# Patient Record
Sex: MALE | Race: BLACK OR AFRICAN AMERICAN | Employment: FULL TIME | URBAN - METROPOLITAN AREA
[De-identification: names, ages, dates, MRNs, and addresses within clinical notes are randomized per-mention and may not be internally consistent; named-entity substitution may affect disease eponyms.]

---

## 2017-06-26 ENCOUNTER — OFFICE VISIT (OUTPATIENT)
Dept: FAMILY MEDICINE CLINIC | Facility: CLINIC | Age: 47
End: 2017-06-26

## 2017-06-26 VITALS
WEIGHT: 234.31 LBS | TEMPERATURE: 98 F | DIASTOLIC BLOOD PRESSURE: 84 MMHG | OXYGEN SATURATION: 98 % | SYSTOLIC BLOOD PRESSURE: 128 MMHG | RESPIRATION RATE: 14 BRPM | HEART RATE: 82 BPM | BODY MASS INDEX: 34.7 KG/M2 | HEIGHT: 69 IN

## 2017-06-26 DIAGNOSIS — Z79.4 TYPE 2 DIABETES MELLITUS WITHOUT COMPLICATION, WITH LONG-TERM CURRENT USE OF INSULIN (HCC): Primary | ICD-10-CM

## 2017-06-26 DIAGNOSIS — E78.2 MIXED HYPERLIPIDEMIA: ICD-10-CM

## 2017-06-26 DIAGNOSIS — I10 ESSENTIAL HYPERTENSION: ICD-10-CM

## 2017-06-26 DIAGNOSIS — E11.9 TYPE 2 DIABETES MELLITUS WITHOUT COMPLICATION, WITH LONG-TERM CURRENT USE OF INSULIN (HCC): Primary | ICD-10-CM

## 2017-06-26 PROCEDURE — 99203 OFFICE O/P NEW LOW 30 MIN: CPT | Performed by: FAMILY MEDICINE

## 2017-06-26 RX ORDER — ATORVASTATIN CALCIUM 80 MG/1
80 TABLET, FILM COATED ORAL DAILY
Refills: 0 | COMMUNITY
Start: 2017-06-22 | End: 2017-07-17

## 2017-06-26 RX ORDER — OMEGA-3-ACID ETHYL ESTERS 1 G/1
1 CAPSULE, LIQUID FILLED ORAL DAILY
COMMUNITY

## 2017-06-26 RX ORDER — FLUTICASONE PROPIONATE 50 MCG
2 SPRAY, SUSPENSION (ML) NASAL DAILY
Refills: 8 | COMMUNITY
Start: 2017-06-08 | End: 2017-07-17

## 2017-06-26 RX ORDER — PEN NEEDLE, DIABETIC 31 GX5/16"
NEEDLE, DISPOSABLE MISCELLANEOUS DAILY
Refills: 3 | COMMUNITY
Start: 2017-04-07 | End: 2018-01-09

## 2017-06-26 RX ORDER — INSULIN GLARGINE 100 [IU]/ML
10 INJECTION, SOLUTION SUBCUTANEOUS NIGHTLY
Refills: 0 | COMMUNITY
Start: 2017-06-10 | End: 2019-03-23

## 2017-06-26 RX ORDER — LISINOPRIL AND HYDROCHLOROTHIAZIDE 25; 20 MG/1; MG/1
1 TABLET ORAL DAILY
Refills: 1 | COMMUNITY
Start: 2017-06-22 | End: 2017-07-17

## 2017-06-26 NOTE — PROGRESS NOTES
CC: meds check    HPI:     Pt has diabetes. Diagnosed 3 years ago. He is on Lantus and metformin. Has been under control. Was exercising more previously. 11/7/16 his a1c was 6.6 at [facility]. HTN: chronic, stable.      ROS: :  GENERAL HEALTH: feels well oth

## 2017-07-10 ENCOUNTER — NURSE ONLY (OUTPATIENT)
Dept: FAMILY MEDICINE CLINIC | Facility: CLINIC | Age: 47
End: 2017-07-10

## 2017-07-10 DIAGNOSIS — Z00.00 GENERAL MEDICAL EXAM: Primary | ICD-10-CM

## 2017-07-10 LAB
25-HYDROXYVITAMIN D (TOTAL): 35.1 NG/ML (ref 30–100)
ALBUMIN SERPL-MCNC: 3.4 G/DL (ref 3.5–4.8)
ALP LIVER SERPL-CCNC: 100 U/L (ref 45–117)
ALT SERPL-CCNC: 38 U/L (ref 17–63)
AST SERPL-CCNC: 22 U/L (ref 15–41)
BASOPHILS # BLD AUTO: 0.05 X10(3) UL (ref 0–0.1)
BASOPHILS NFR BLD AUTO: 0.8 %
BILIRUB SERPL-MCNC: 0.5 MG/DL (ref 0.1–2)
BUN BLD-MCNC: 22 MG/DL (ref 8–20)
CALCIUM BLD-MCNC: 8.6 MG/DL (ref 8.3–10.3)
CHLORIDE: 106 MMOL/L (ref 101–111)
CHOLEST SMN-MCNC: 113 MG/DL (ref ?–200)
CO2: 28 MMOL/L (ref 22–32)
CREAT BLD-MCNC: 1 MG/DL (ref 0.7–1.3)
CREAT UR-SCNC: 210 MG/DL
EOSINOPHIL # BLD AUTO: 0.12 X10(3) UL (ref 0–0.3)
EOSINOPHIL NFR BLD AUTO: 1.8 %
ERYTHROCYTE [DISTWIDTH] IN BLOOD BY AUTOMATED COUNT: 12.8 % (ref 11.5–16)
EST. AVERAGE GLUCOSE BLD GHB EST-MCNC: 157 MG/DL (ref 68–126)
GLUCOSE BLD-MCNC: 150 MG/DL (ref 70–99)
HBA1C MFR BLD HPLC: 7.1 % (ref ?–5.7)
HCT VFR BLD AUTO: 45.9 % (ref 37–53)
HDLC SERPL-MCNC: 42 MG/DL (ref 45–?)
HDLC SERPL: 2.69 {RATIO} (ref ?–4.97)
HGB BLD-MCNC: 15.4 G/DL (ref 13–17)
IMMATURE GRANULOCYTE COUNT: 0.02 X10(3) UL (ref 0–1)
IMMATURE GRANULOCYTE RATIO %: 0.3 %
LDLC SERPL CALC-MCNC: 63 MG/DL (ref ?–130)
LYMPHOCYTES # BLD AUTO: 1.99 X10(3) UL (ref 0.9–4)
LYMPHOCYTES NFR BLD AUTO: 30.5 %
M PROTEIN MFR SERPL ELPH: 7.3 G/DL (ref 6.1–8.3)
MCH RBC QN AUTO: 29.7 PG (ref 27–33.2)
MCHC RBC AUTO-ENTMCNC: 33.6 G/DL (ref 31–37)
MCV RBC AUTO: 88.4 FL (ref 80–99)
MICROALBUMIN UR-MCNC: 1.38 MG/DL
MICROALBUMIN/CREAT 24H UR-RTO: 6.6 UG/MG (ref ?–30)
MONOCYTES # BLD AUTO: 0.59 X10(3) UL (ref 0.1–0.6)
MONOCYTES NFR BLD AUTO: 9 %
NEUTROPHIL ABS PRELIM: 3.75 X10 (3) UL (ref 1.3–6.7)
NEUTROPHILS # BLD AUTO: 3.75 X10(3) UL (ref 1.3–6.7)
NEUTROPHILS NFR BLD AUTO: 57.6 %
NONHDLC SERPL-MCNC: 71 MG/DL (ref ?–130)
PLATELET # BLD AUTO: 208 10(3)UL (ref 150–450)
POTASSIUM SERPL-SCNC: 3.6 MMOL/L (ref 3.6–5.1)
RBC # BLD AUTO: 5.19 X10(6)UL (ref 4.3–5.7)
RED CELL DISTRIBUTION WIDTH-SD: 42 FL (ref 35.1–46.3)
SODIUM SERPL-SCNC: 139 MMOL/L (ref 136–144)
TRIGLYCERIDES: 41 MG/DL (ref ?–150)
TSI SER-ACNC: 1.09 MIU/ML (ref 0.35–5.5)
VLDL: 8 MG/DL (ref 5–40)
WBC # BLD AUTO: 6.5 X10(3) UL (ref 4–13)

## 2017-07-10 PROCEDURE — 80061 LIPID PANEL: CPT | Performed by: FAMILY MEDICINE

## 2017-07-10 PROCEDURE — 83036 HEMOGLOBIN GLYCOSYLATED A1C: CPT | Performed by: FAMILY MEDICINE

## 2017-07-10 PROCEDURE — 82306 VITAMIN D 25 HYDROXY: CPT | Performed by: FAMILY MEDICINE

## 2017-07-10 PROCEDURE — 80050 GENERAL HEALTH PANEL: CPT | Performed by: FAMILY MEDICINE

## 2017-07-10 PROCEDURE — 36415 COLL VENOUS BLD VENIPUNCTURE: CPT | Performed by: FAMILY MEDICINE

## 2017-07-10 PROCEDURE — 82570 ASSAY OF URINE CREATININE: CPT | Performed by: FAMILY MEDICINE

## 2017-07-10 PROCEDURE — 82043 UR ALBUMIN QUANTITATIVE: CPT | Performed by: FAMILY MEDICINE

## 2017-07-17 ENCOUNTER — OFFICE VISIT (OUTPATIENT)
Dept: FAMILY MEDICINE CLINIC | Facility: CLINIC | Age: 47
End: 2017-07-17

## 2017-07-17 VITALS
TEMPERATURE: 99 F | RESPIRATION RATE: 14 BRPM | HEART RATE: 85 BPM | BODY MASS INDEX: 35.31 KG/M2 | WEIGHT: 238.38 LBS | SYSTOLIC BLOOD PRESSURE: 128 MMHG | OXYGEN SATURATION: 96 % | HEIGHT: 69 IN | DIASTOLIC BLOOD PRESSURE: 84 MMHG

## 2017-07-17 DIAGNOSIS — J01.00 ACUTE NON-RECURRENT MAXILLARY SINUSITIS: ICD-10-CM

## 2017-07-17 DIAGNOSIS — Z79.4 TYPE 2 DIABETES MELLITUS WITHOUT COMPLICATION, WITH LONG-TERM CURRENT USE OF INSULIN (HCC): Primary | ICD-10-CM

## 2017-07-17 DIAGNOSIS — H65.91 RIGHT NON-SUPPURATIVE OTITIS MEDIA: ICD-10-CM

## 2017-07-17 DIAGNOSIS — E11.9 TYPE 2 DIABETES MELLITUS WITHOUT COMPLICATION, WITH LONG-TERM CURRENT USE OF INSULIN (HCC): Primary | ICD-10-CM

## 2017-07-17 PROCEDURE — 99214 OFFICE O/P EST MOD 30 MIN: CPT | Performed by: FAMILY MEDICINE

## 2017-07-17 RX ORDER — LISINOPRIL AND HYDROCHLOROTHIAZIDE 25; 20 MG/1; MG/1
1 TABLET ORAL DAILY
Qty: 90 TABLET | Refills: 3 | Status: SHIPPED | OUTPATIENT
Start: 2017-07-17 | End: 2018-07-20

## 2017-07-17 RX ORDER — AMOXICILLIN AND CLAVULANATE POTASSIUM 875; 125 MG/1; MG/1
1 TABLET, FILM COATED ORAL 2 TIMES DAILY
Qty: 20 TABLET | Refills: 0 | Status: SHIPPED | OUTPATIENT
Start: 2017-07-17 | End: 2017-07-27

## 2017-07-17 RX ORDER — ATORVASTATIN CALCIUM 80 MG/1
80 TABLET, FILM COATED ORAL DAILY
Qty: 90 TABLET | Refills: 3 | Status: SHIPPED | OUTPATIENT
Start: 2017-07-17 | End: 2018-07-20

## 2017-07-17 RX ORDER — FLUTICASONE PROPIONATE 50 MCG
2 SPRAY, SUSPENSION (ML) NASAL DAILY
Qty: 1 BOTTLE | Refills: 4 | Status: SHIPPED | OUTPATIENT
Start: 2017-07-17 | End: 2018-07-20

## 2017-07-17 RX ORDER — LANCETS 33 GAUGE
1 EACH MISCELLANEOUS 2 TIMES DAILY
Qty: 1 BOX | Refills: 3 | Status: SHIPPED | OUTPATIENT
Start: 2017-07-17 | End: 2018-07-17

## 2017-07-17 NOTE — TELEPHONE ENCOUNTER
LOV: 7/17/17 for diabetes    ONETOUCH ULTRA BLUE In Vitro Strip [place] Med Name: ONE TOUCH ULTRA BLUE TEST ST(NEW)50]  TEST ONCE DAILY, AS DIRECTED, Normal, Disp-100 strip, R-0    No future appointments. Please advise.

## 2017-07-17 NOTE — TELEPHONE ENCOUNTER
LOV: 7/17/17    atorvastatin 80 MG Oral Tab  0 6/22/2017    Sig :  Take 80 mg by mouth daily. Route:   Oral       Fluticasone Propionate 50 MCG/ACT Nasal Suspension  8 6/8/2017    Sig :  2 sprays by Each Nare route daily.      Route:   Each Nare

## 2017-07-17 NOTE — PROGRESS NOTES
CC: meds check; congestion    HPI:     Congestion:   Location nasal, ear  Quality pressure, drainage  Severity moderate  Duration 5 days or more    DM:   Chronic, stable.      ROS:   GENERAL HEALTH: feels well otherwise  SKIN: denies any unusual skin lesion placed in this encounter. Meds & Refills for this Visit:  Signed Prescriptions Disp Refills    Amoxicillin-Pot Clavulanate 875-125 MG Oral Tab 20 tablet 0      Sig: Take 1 tablet by mouth 2 (two) times daily.            Imaging & Consults:  None

## 2017-07-26 ENCOUNTER — TELEPHONE (OUTPATIENT)
Dept: FAMILY MEDICINE CLINIC | Facility: CLINIC | Age: 47
End: 2017-07-26

## 2017-07-27 ENCOUNTER — MED REC SCAN ONLY (OUTPATIENT)
Dept: FAMILY MEDICINE CLINIC | Facility: CLINIC | Age: 47
End: 2017-07-27

## 2017-08-21 RX ORDER — FEXOFENADINE HCL AND PSEUDOEPHEDRINE HCI 180; 240 MG/1; MG/1
1 TABLET, EXTENDED RELEASE ORAL DAILY
Qty: 90 TABLET | Refills: 3 | Status: SHIPPED | OUTPATIENT
Start: 2017-08-21 | End: 2018-04-13

## 2017-09-11 ENCOUNTER — TELEPHONE (OUTPATIENT)
Dept: FAMILY MEDICINE CLINIC | Facility: CLINIC | Age: 47
End: 2017-09-11

## 2017-09-11 ENCOUNTER — NURSE ONLY (OUTPATIENT)
Dept: FAMILY MEDICINE CLINIC | Facility: CLINIC | Age: 47
End: 2017-09-11

## 2017-09-11 VITALS — DIASTOLIC BLOOD PRESSURE: 84 MMHG | SYSTOLIC BLOOD PRESSURE: 112 MMHG

## 2017-09-11 NOTE — TELEPHONE ENCOUNTER
Patient here for b/p check. States that he has been under a lot of stress at work lately. B/P - 112/84. B/P at home was 120/98. Patient states is not having any chest pain, head ache or any other symptoms. He will continue to monitor b/p at home.

## 2017-09-18 ENCOUNTER — TELEPHONE (OUTPATIENT)
Dept: FAMILY MEDICINE CLINIC | Facility: CLINIC | Age: 47
End: 2017-09-18

## 2017-09-18 NOTE — TELEPHONE ENCOUNTER
KELLY FROM [facility] WOULD LIKE TO SPEAK TO NURSE ABOUT HIS A1C AND LDL.  THE NUMBER U CALL YOU CAN LEAVE MESSAGE

## 2017-09-19 NOTE — TELEPHONE ENCOUNTER
Patient states the nurse is requesting the information from Paulina Caldera. Patient states it is ok to release this information to Paulina Caldera as this nurse is handling his case and requests this each time patient has labs done.       Spoke with Elton Concepcion Registered Nurse stephan

## 2017-09-25 ENCOUNTER — NURSE ONLY (OUTPATIENT)
Dept: FAMILY MEDICINE CLINIC | Facility: CLINIC | Age: 47
End: 2017-09-25

## 2017-09-25 VITALS — SYSTOLIC BLOOD PRESSURE: 124 MMHG | DIASTOLIC BLOOD PRESSURE: 89 MMHG

## 2017-09-25 DIAGNOSIS — Z23 NEED FOR VACCINATION: ICD-10-CM

## 2017-09-25 PROCEDURE — 90471 IMMUNIZATION ADMIN: CPT | Performed by: FAMILY MEDICINE

## 2017-09-25 PROCEDURE — 90686 IIV4 VACC NO PRSV 0.5 ML IM: CPT | Performed by: FAMILY MEDICINE

## 2017-09-28 ENCOUNTER — MED REC SCAN ONLY (OUTPATIENT)
Dept: FAMILY MEDICINE CLINIC | Facility: CLINIC | Age: 47
End: 2017-09-28

## 2017-10-04 NOTE — TELEPHONE ENCOUNTER
Last refill: 6- placed on pt's chart as historical    Last Visit: 7- for diabetes    Next Visit: No future appointments. Forward to Dr. Mariza Gates please advise on refills. Thanks.

## 2017-10-05 RX ORDER — INSULIN GLARGINE 100 [IU]/ML
INJECTION, SOLUTION SUBCUTANEOUS
Qty: 15 ML | Refills: 2 | Status: SHIPPED | OUTPATIENT
Start: 2017-10-05 | End: 2018-01-22

## 2018-01-09 RX ORDER — PEN NEEDLE, DIABETIC 31 GX5/16"
NEEDLE, DISPOSABLE MISCELLANEOUS
Qty: 100 EACH | Refills: 2 | Status: SHIPPED | OUTPATIENT
Start: 2018-01-09 | End: 2018-11-29

## 2018-01-09 NOTE — TELEPHONE ENCOUNTER
LOV: 7/17/17 diabetes  A1C: 7/10/17    BD PEN NEEDLE SHORT U/F 31G X 8 MM Does not apply Misc  3 4/7/2017    Sig :  daily.      Route:   (none)     Dr. Z Tono       Future Appointments  Date Time Provider Maximiliano Rdz   1/15/2018 8:15 AM [facility] LIVIER DEJON

## 2018-01-15 ENCOUNTER — NURSE ONLY (OUTPATIENT)
Dept: FAMILY MEDICINE CLINIC | Facility: CLINIC | Age: 48
End: 2018-01-15

## 2018-01-15 DIAGNOSIS — E11.9 TYPE 2 DIABETES MELLITUS WITHOUT COMPLICATION, WITH LONG-TERM CURRENT USE OF INSULIN (HCC): Primary | ICD-10-CM

## 2018-01-15 DIAGNOSIS — Z79.4 TYPE 2 DIABETES MELLITUS WITHOUT COMPLICATION, WITH LONG-TERM CURRENT USE OF INSULIN (HCC): Primary | ICD-10-CM

## 2018-01-15 LAB
EST. AVERAGE GLUCOSE BLD GHB EST-MCNC: 146 MG/DL (ref 68–126)
HBA1C MFR BLD HPLC: 6.7 % (ref ?–5.7)

## 2018-01-15 PROCEDURE — 83036 HEMOGLOBIN GLYCOSYLATED A1C: CPT | Performed by: FAMILY MEDICINE

## 2018-01-15 PROCEDURE — 36415 COLL VENOUS BLD VENIPUNCTURE: CPT | Performed by: FAMILY MEDICINE

## 2018-01-22 ENCOUNTER — OFFICE VISIT (OUTPATIENT)
Dept: FAMILY MEDICINE CLINIC | Facility: CLINIC | Age: 48
End: 2018-01-22

## 2018-01-22 VITALS
OXYGEN SATURATION: 99 % | WEIGHT: 226 LBS | RESPIRATION RATE: 12 BRPM | TEMPERATURE: 97 F | DIASTOLIC BLOOD PRESSURE: 74 MMHG | HEIGHT: 69 IN | HEART RATE: 97 BPM | BODY MASS INDEX: 33.47 KG/M2 | SYSTOLIC BLOOD PRESSURE: 108 MMHG

## 2018-01-22 DIAGNOSIS — I10 ESSENTIAL HYPERTENSION: ICD-10-CM

## 2018-01-22 DIAGNOSIS — Z79.4 TYPE 2 DIABETES MELLITUS WITHOUT COMPLICATION, WITH LONG-TERM CURRENT USE OF INSULIN (HCC): Primary | ICD-10-CM

## 2018-01-22 DIAGNOSIS — E11.9 TYPE 2 DIABETES MELLITUS WITHOUT COMPLICATION, WITH LONG-TERM CURRENT USE OF INSULIN (HCC): Primary | ICD-10-CM

## 2018-01-22 DIAGNOSIS — E78.2 MIXED HYPERLIPIDEMIA: ICD-10-CM

## 2018-01-22 PROCEDURE — 99214 OFFICE O/P EST MOD 30 MIN: CPT | Performed by: FAMILY MEDICINE

## 2018-01-22 RX ORDER — MELATONIN
COMMUNITY
End: 2019-03-23

## 2018-01-22 RX ORDER — ASPIRIN 81 MG/1
81 TABLET, CHEWABLE ORAL
COMMUNITY
Start: 2012-06-14

## 2018-01-22 NOTE — PROGRESS NOTES
CC: meds check    HPI:     DM: chronic, stable. Compliant. HTN: chronic, stable. Compliant. Lipids: chronic, stable. Compliant.        ROS: has lost weight, no cp or abd pain, no vomiting or diarrhea    Past Medical History:   Diagnosis Date   • Josefina

## 2018-01-24 ENCOUNTER — TELEPHONE (OUTPATIENT)
Dept: FAMILY MEDICINE CLINIC | Facility: CLINIC | Age: 48
End: 2018-01-24

## 2018-04-13 RX ORDER — FEXOFENADINE HYDROCHLORIDE AND PSEUDOEPHEDRINE HYDROCHLORIDE 180; 240 MG/1; MG/1
TABLET, FILM COATED, EXTENDED RELEASE ORAL
Qty: 90 TABLET | Refills: 0 | Status: SHIPPED | OUTPATIENT
Start: 2018-04-13 | End: 2018-11-12

## 2018-04-13 NOTE — TELEPHONE ENCOUNTER
Pt requesting RX for    ALLEGRA-D ALLERGY & CONGESTION 180-240MG oral Tab 24 hr.( never prescribe by PCP Dr. Marylee Collie)    Last labs 07/10/17. Last seen on 01/22/18 CC: Meds Check  /74. No future appointments. Please advise.

## 2018-06-27 ENCOUNTER — NURSE ONLY (OUTPATIENT)
Dept: FAMILY MEDICINE CLINIC | Facility: CLINIC | Age: 48
End: 2018-06-27

## 2018-06-27 DIAGNOSIS — Z00.00 ANNUAL PHYSICAL EXAM: Primary | ICD-10-CM

## 2018-06-27 PROCEDURE — 36415 COLL VENOUS BLD VENIPUNCTURE: CPT | Performed by: FAMILY MEDICINE

## 2018-06-27 PROCEDURE — 82306 VITAMIN D 25 HYDROXY: CPT | Performed by: FAMILY MEDICINE

## 2018-06-27 PROCEDURE — 80061 LIPID PANEL: CPT | Performed by: FAMILY MEDICINE

## 2018-06-27 PROCEDURE — 80050 GENERAL HEALTH PANEL: CPT | Performed by: FAMILY MEDICINE

## 2018-06-27 PROCEDURE — 82550 ASSAY OF CK (CPK): CPT | Performed by: FAMILY MEDICINE

## 2018-06-27 PROCEDURE — 83036 HEMOGLOBIN GLYCOSYLATED A1C: CPT | Performed by: FAMILY MEDICINE

## 2018-06-27 NOTE — PROGRESS NOTES
Pt here x fasting labs,    performed by DEMETRA Obrien MA w/ no accident reported, on RT arm. pt tolerate.

## 2018-07-02 ENCOUNTER — OFFICE VISIT (OUTPATIENT)
Dept: FAMILY MEDICINE CLINIC | Facility: CLINIC | Age: 48
End: 2018-07-02

## 2018-07-02 VITALS
BODY MASS INDEX: 32.78 KG/M2 | HEART RATE: 77 BPM | WEIGHT: 218.81 LBS | OXYGEN SATURATION: 99 % | DIASTOLIC BLOOD PRESSURE: 80 MMHG | SYSTOLIC BLOOD PRESSURE: 130 MMHG | TEMPERATURE: 98 F | HEIGHT: 68.5 IN | RESPIRATION RATE: 18 BRPM

## 2018-07-02 DIAGNOSIS — E78.2 MIXED HYPERLIPIDEMIA: ICD-10-CM

## 2018-07-02 DIAGNOSIS — E11.9 TYPE 2 DIABETES MELLITUS WITHOUT COMPLICATION, WITH LONG-TERM CURRENT USE OF INSULIN (HCC): Primary | ICD-10-CM

## 2018-07-02 DIAGNOSIS — I10 ESSENTIAL HYPERTENSION: ICD-10-CM

## 2018-07-02 DIAGNOSIS — Z79.4 TYPE 2 DIABETES MELLITUS WITHOUT COMPLICATION, WITH LONG-TERM CURRENT USE OF INSULIN (HCC): Primary | ICD-10-CM

## 2018-07-02 PROCEDURE — 99214 OFFICE O/P EST MOD 30 MIN: CPT | Performed by: FAMILY MEDICINE

## 2018-07-02 RX ORDER — AMOXICILLIN AND CLAVULANATE POTASSIUM 875; 125 MG/1; MG/1
TABLET, FILM COATED ORAL
COMMUNITY
Start: 2018-06-24 | End: 2018-07-04

## 2018-07-02 NOTE — PROGRESS NOTES
CC: meds check    HPI:     DM: chronic, stable. Compliant. HTN: chronic, stable. Compliant. Lipids: chronic, stable. Compliant.      ROS:   GENERAL HEALTH: feels well otherwise  SKIN: denies any unusual skin lesions or rashes  RESPIRATORY: denies sh

## 2018-07-20 RX ORDER — ATORVASTATIN CALCIUM 80 MG/1
80 TABLET, FILM COATED ORAL DAILY
Qty: 90 TABLET | Refills: 0 | Status: SHIPPED | OUTPATIENT
Start: 2018-07-20 | End: 2018-10-04

## 2018-07-20 RX ORDER — LISINOPRIL AND HYDROCHLOROTHIAZIDE 25; 20 MG/1; MG/1
1 TABLET ORAL DAILY
Qty: 90 TABLET | Refills: 0 | Status: SHIPPED | OUTPATIENT
Start: 2018-07-20 | End: 2018-10-04

## 2018-07-20 RX ORDER — FLUTICASONE PROPIONATE 50 MCG
2 SPRAY, SUSPENSION (ML) NASAL DAILY
Qty: 1 BOTTLE | Refills: 0 | Status: SHIPPED | OUTPATIENT
Start: 2018-07-20 | End: 2018-09-17

## 2018-07-20 NOTE — TELEPHONE ENCOUNTER
LOV:  7/2/18 for type 2 diabetes  A1C: 6/27/18    MetFORMIN HCl 500 MG Oral Tab 180 tablet 3 7/17/2017    Sig :  Take 1 tablet (500 mg total) by mouth 2 (two) times daily before meals.      Route:   Oral       Lisinopril-Hydrochlorothiazide 20-25 MG Oral Ta

## 2018-07-20 NOTE — TELEPHONE ENCOUNTER
Pt needs a refill on Metformin, Lisinopril. Flonase and Atorvastatin. Would like sent to [facility] in [facility] on [address].

## 2018-08-20 ENCOUNTER — TELEPHONE (OUTPATIENT)
Dept: FAMILY MEDICINE CLINIC | Facility: CLINIC | Age: 48
End: 2018-08-20

## 2018-08-20 NOTE — TELEPHONE ENCOUNTER
Received letter from [facility] stating that there may be a potential gap in care related to atorvastatin 80mg and lisinopril-hctz 20-25mg. Call to patient and patient advised that he doesn't have [facility] insurance anymore.  He is still getting his scripts filled

## 2018-09-14 NOTE — TELEPHONE ENCOUNTER
LOV: 7/2/18 for type 2 diabetes    Fluticasone Propionate 50 MCG/ACT Nasal Suspension 1 Bottle 0 7/20/2018    Sig :  2 sprays by Each Nare route daily. No future appointments. Please advise.

## 2018-09-17 RX ORDER — FLUTICASONE PROPIONATE 50 MCG
2 SPRAY, SUSPENSION (ML) NASAL DAILY
Qty: 1 BOTTLE | Refills: 0 | Status: SHIPPED | OUTPATIENT
Start: 2018-09-17 | End: 2018-11-12

## 2018-10-02 RX ORDER — INSULIN GLARGINE 100 [IU]/ML
INJECTION, SOLUTION SUBCUTANEOUS
Qty: 15 ML | Refills: 0 | Status: SHIPPED | OUTPATIENT
Start: 2018-10-02 | End: 2019-03-27

## 2018-10-02 NOTE — TELEPHONE ENCOUNTER
Per verbal Dr. Lupe Power ok to fill  LOV: 7/2/18 for type 2 diabetes   A1C: 6/27/18 6.8    LANTUS SOLOSTAR 100 UNIT/ML Subcutaneous Solution Pen-injector  0 6/10/2017    Sig :  Inject 10 Units as directed nightly.      Route:   Injection     Segundo De Souza

## 2018-10-04 ENCOUNTER — TELEPHONE (OUTPATIENT)
Dept: FAMILY MEDICINE CLINIC | Facility: CLINIC | Age: 48
End: 2018-10-04

## 2018-10-04 RX ORDER — ATORVASTATIN CALCIUM 80 MG/1
80 TABLET, FILM COATED ORAL DAILY
Qty: 90 TABLET | Refills: 0 | Status: SHIPPED | OUTPATIENT
Start: 2018-10-04 | End: 2018-11-24

## 2018-10-04 RX ORDER — LISINOPRIL AND HYDROCHLOROTHIAZIDE 25; 20 MG/1; MG/1
1 TABLET ORAL DAILY
Qty: 90 TABLET | Refills: 0 | Status: SHIPPED | OUTPATIENT
Start: 2018-10-04 | End: 2018-11-24

## 2018-10-04 NOTE — TELEPHONE ENCOUNTER
Per verbal Dr. Hema Dumont ok to fill  LOV: 7/2/18 for type 2 diabetes    Lisinopril-Hydrochlorothiazide 20-25 MG Oral Tab 90 tablet 0 7/20/2018    Sig :  Take 1 tablet by mouth daily.      Route:   Oral       Lisinopril-Hydrochlorothiazide 20-25 MG Oral Tab 90

## 2018-10-09 ENCOUNTER — TELEPHONE (OUTPATIENT)
Dept: FAMILY MEDICINE CLINIC | Facility: CLINIC | Age: 48
End: 2018-10-09

## 2018-10-09 NOTE — TELEPHONE ENCOUNTER
Per verbal Dr. Richy Pedraza ok to place. LOV: 7/2/18 for type 2 diabetes  A1C: 6/27 6.8    MetFORMIN HCl 500 MG Oral Tab 180 tablet 0 7/20/2018    Sig :  Take 1 tablet (500 mg total) by mouth 2 (two) times daily before meals.      Route:   Oral       No future

## 2018-11-10 ENCOUNTER — IMMUNIZATION (OUTPATIENT)
Dept: FAMILY MEDICINE CLINIC | Facility: CLINIC | Age: 48
End: 2018-11-10
Payer: COMMERCIAL

## 2018-11-10 DIAGNOSIS — Z23 NEED FOR VACCINATION: ICD-10-CM

## 2018-11-10 PROCEDURE — 90686 IIV4 VACC NO PRSV 0.5 ML IM: CPT | Performed by: FAMILY MEDICINE

## 2018-11-10 PROCEDURE — 90471 IMMUNIZATION ADMIN: CPT | Performed by: FAMILY MEDICINE

## 2018-11-12 NOTE — TELEPHONE ENCOUNTER
LOV:  7/2/18 for type 2 diabetes    ALLEGRA-D ALLERGY & CONGESTION 180-240 MG Oral Tablet 24 Hr 90 tablet 0 4/13/2018    Sig :  TAKE 1 TABLET BY MOUTH EVERY DAY     Route:   (none)       Fluticasone Propionate 50 MCG/ACT Nasal Suspension 1 Bottle 0 9/17/20

## 2018-11-13 RX ORDER — FEXOFENADINE HYDROCHLORIDE AND PSEUDOEPHEDRINE HYDROCHLORIDE 180; 240 MG/1; MG/1
TABLET, FILM COATED, EXTENDED RELEASE ORAL
Qty: 90 TABLET | Refills: 0 | Status: SHIPPED | OUTPATIENT
Start: 2018-11-13 | End: 2019-02-01

## 2018-11-13 RX ORDER — FLUTICASONE PROPIONATE 50 MCG
SPRAY, SUSPENSION (ML) NASAL
Qty: 1 BOTTLE | Refills: 0 | Status: SHIPPED | OUTPATIENT
Start: 2018-11-13 | End: 2019-01-31

## 2018-11-24 NOTE — TELEPHONE ENCOUNTER
Medication Quantity Refills Start End   Lisinopril-Hydrochlorothiazide 20-25 MG Oral Tab 90 tablet 0 10/4/2018      Medication Quantity Refills Start End   atorvastatin 80 MG Oral Tab 90 tablet 0 10/4/2018    Sig :  Take 1 tablet (80 mg total) by mouth nasra

## 2018-11-27 RX ORDER — LISINOPRIL AND HYDROCHLOROTHIAZIDE 25; 20 MG/1; MG/1
1 TABLET ORAL DAILY
Qty: 90 TABLET | Refills: 0 | Status: SHIPPED | OUTPATIENT
Start: 2018-11-27 | End: 2019-02-20

## 2018-11-27 RX ORDER — ATORVASTATIN CALCIUM 80 MG/1
TABLET, FILM COATED ORAL
Qty: 90 TABLET | Refills: 0 | Status: SHIPPED | OUTPATIENT
Start: 2018-11-27 | End: 2019-02-20

## 2018-11-27 NOTE — TELEPHONE ENCOUNTER
LOV: 7/2/18 for type 2 diabetes  6/27/18: A1C 6.8    MetFORMIN HCl 500 MG Oral Tab 180 tablet 0 10/9/2018    Sig :  Take 1 tablet (500 mg total) by mouth 2 (two) times daily before meals. Route:   Oral       No future appointments. Please advise.

## 2018-11-30 RX ORDER — PEN NEEDLE, DIABETIC 31 GX5/16"
NEEDLE, DISPOSABLE MISCELLANEOUS
Qty: 100 EACH | Refills: 1 | Status: SHIPPED | OUTPATIENT
Start: 2018-11-30 | End: 2019-06-16

## 2018-11-30 NOTE — TELEPHONE ENCOUNTER
LOV: 7/2/18 for type 2 diabetes  A1C: 6.8 6/27/18    BD PEN NEEDLE SHORT U/F 31G X 8 MM Does not apply Misc 100 each 2 1/9/2018    Sig :  USE WITH INSULIN DAILY     Route:   (none)       No future appointments. Please advise.

## 2019-01-03 ENCOUNTER — TELEPHONE (OUTPATIENT)
Dept: FAMILY MEDICINE CLINIC | Facility: CLINIC | Age: 49
End: 2019-01-03

## 2019-01-12 ENCOUNTER — NURSE ONLY (OUTPATIENT)
Dept: FAMILY MEDICINE CLINIC | Facility: CLINIC | Age: 49
End: 2019-01-12
Payer: COMMERCIAL

## 2019-01-12 DIAGNOSIS — E11.9 TYPE 2 DIABETES MELLITUS WITHOUT COMPLICATION, WITH LONG-TERM CURRENT USE OF INSULIN (HCC): Primary | ICD-10-CM

## 2019-01-12 DIAGNOSIS — Z79.4 TYPE 2 DIABETES MELLITUS WITHOUT COMPLICATION, WITH LONG-TERM CURRENT USE OF INSULIN (HCC): Primary | ICD-10-CM

## 2019-01-12 LAB
ALBUMIN SERPL-MCNC: 3.5 G/DL (ref 3.1–4.5)
ALBUMIN/GLOB SERPL: 0.9 {RATIO} (ref 1–2)
ALP LIVER SERPL-CCNC: 86 U/L (ref 45–117)
ALT SERPL-CCNC: 32 U/L (ref 17–63)
ANION GAP SERPL CALC-SCNC: 5 MMOL/L (ref 0–18)
AST SERPL-CCNC: 19 U/L (ref 15–41)
BILIRUB SERPL-MCNC: 0.9 MG/DL (ref 0.1–2)
BUN BLD-MCNC: 20 MG/DL (ref 8–20)
BUN/CREAT SERPL: 17.2 (ref 10–20)
CALCIUM BLD-MCNC: 9.1 MG/DL (ref 8.3–10.3)
CHLORIDE SERPL-SCNC: 107 MMOL/L (ref 101–111)
CK SERPL-CCNC: 144 IU/L (ref 39–308)
CO2 SERPL-SCNC: 30 MMOL/L (ref 22–32)
CREAT BLD-MCNC: 1.16 MG/DL (ref 0.7–1.3)
EST. AVERAGE GLUCOSE BLD GHB EST-MCNC: 137 MG/DL (ref 68–126)
GLOBULIN PLAS-MCNC: 4.1 G/DL (ref 2.8–4.4)
GLUCOSE BLD-MCNC: 117 MG/DL (ref 70–99)
HBA1C MFR BLD HPLC: 6.4 % (ref ?–5.7)
LDLC SERPL DIRECT ASSAY-MCNC: 102 MG/DL (ref ?–100)
M PROTEIN MFR SERPL ELPH: 7.6 G/DL (ref 6.4–8.2)
OSMOLALITY SERPL CALC.SUM OF ELEC: 298 MOSM/KG (ref 275–295)
POTASSIUM SERPL-SCNC: 3.8 MMOL/L (ref 3.6–5.1)
SODIUM SERPL-SCNC: 142 MMOL/L (ref 136–144)

## 2019-01-12 PROCEDURE — 83721 ASSAY OF BLOOD LIPOPROTEIN: CPT | Performed by: FAMILY MEDICINE

## 2019-01-12 PROCEDURE — 80053 COMPREHEN METABOLIC PANEL: CPT | Performed by: FAMILY MEDICINE

## 2019-01-12 PROCEDURE — 83036 HEMOGLOBIN GLYCOSYLATED A1C: CPT | Performed by: FAMILY MEDICINE

## 2019-01-12 PROCEDURE — 82550 ASSAY OF CK (CPK): CPT | Performed by: FAMILY MEDICINE

## 2019-01-12 PROCEDURE — 36415 COLL VENOUS BLD VENIPUNCTURE: CPT | Performed by: FAMILY MEDICINE

## 2019-02-01 RX ORDER — FEXOFENADINE HCL AND PSEUDOEPHEDRINE HCI 180; 240 MG/1; MG/1
1 TABLET, EXTENDED RELEASE ORAL
Qty: 90 TABLET | Refills: 0 | Status: SHIPPED | OUTPATIENT
Start: 2019-02-01 | End: 2019-05-14

## 2019-02-01 RX ORDER — FLUTICASONE PROPIONATE 50 MCG
SPRAY, SUSPENSION (ML) NASAL
Qty: 1 BOTTLE | Refills: 0 | Status: SHIPPED | OUTPATIENT
Start: 2019-02-01 | End: 2019-03-23

## 2019-02-01 NOTE — TELEPHONE ENCOUNTER
LOV: 7/2/18 for type 2 diabetes    ALLEGRA-D ALLERGY & CONGESTION 180-240 MG Oral Tablet 24 Hr 90 tablet 0 11/13/2018    Sig :  TAKE 1 TABLET BY MOUTH ONCE DAILY     Route:   (none)           FLUTICASONE PROPIONATE 50 MCG/ACT Nasal Suspension 1 Bottle 0 11

## 2019-02-20 NOTE — TELEPHONE ENCOUNTER
LOV 7/2/18 for DM, HTN, and hyperlipidemia. Last set of labs done 1/12/19.     ATORVASTATIN 80 MG Oral Tab 90 tablet 0 11/27/2018    Sig :  TAKE 1 TABLET BY MOUTH  DAILY     Route:   (none)       LISINOPRIL-HYDROCHLOROTHIAZIDE 20-25 MG Oral Tab 90 tablet 0

## 2019-02-22 ENCOUNTER — TELEPHONE (OUTPATIENT)
Dept: FAMILY MEDICINE CLINIC | Facility: CLINIC | Age: 49
End: 2019-02-22

## 2019-02-22 RX ORDER — LISINOPRIL AND HYDROCHLOROTHIAZIDE 25; 20 MG/1; MG/1
1 TABLET ORAL DAILY
Qty: 90 TABLET | Refills: 0 | Status: SHIPPED | OUTPATIENT
Start: 2019-02-22 | End: 2019-05-22

## 2019-02-22 RX ORDER — ATORVASTATIN CALCIUM 80 MG/1
TABLET, FILM COATED ORAL
Qty: 90 TABLET | Refills: 0 | Status: SHIPPED | OUTPATIENT
Start: 2019-02-22 | End: 2019-05-22

## 2019-03-23 ENCOUNTER — OFFICE VISIT (OUTPATIENT)
Dept: FAMILY MEDICINE CLINIC | Facility: CLINIC | Age: 49
End: 2019-03-23
Payer: COMMERCIAL

## 2019-03-23 VITALS
TEMPERATURE: 98 F | DIASTOLIC BLOOD PRESSURE: 80 MMHG | WEIGHT: 224 LBS | BODY MASS INDEX: 33.56 KG/M2 | SYSTOLIC BLOOD PRESSURE: 122 MMHG | HEART RATE: 83 BPM | RESPIRATION RATE: 18 BRPM | OXYGEN SATURATION: 98 % | HEIGHT: 68.5 IN

## 2019-03-23 DIAGNOSIS — Z00.00 GENERAL MEDICAL EXAM: Primary | ICD-10-CM

## 2019-03-23 PROCEDURE — 99396 PREV VISIT EST AGE 40-64: CPT | Performed by: FAMILY MEDICINE

## 2019-03-23 RX ORDER — SILDENAFIL 50 MG/1
50 TABLET, FILM COATED ORAL
Qty: 24 TABLET | Refills: 0 | Status: SHIPPED | OUTPATIENT
Start: 2019-03-23 | End: 2020-07-02

## 2019-03-23 RX ORDER — FLUTICASONE PROPIONATE 50 MCG
SPRAY, SUSPENSION (ML) NASAL
COMMUNITY
Start: 2018-11-13 | End: 2020-04-28

## 2019-03-23 NOTE — PROGRESS NOTES
Mavis Ler. is a 50year old male who presents for a complete physical exam.   HPI:   Pt complains of recent multiple ear infections.       Immunization History  Administered            Date(s) Administered    FLULAVAL 6 months & older 0.5 ml Prefilled THE SKIN NIGHTLY Disp: 15 mL Rfl: 0   aspirin 81 MG Oral Chew Tab Chew 81 mg by mouth. Disp:  Rfl:    Omega-3-acid Ethyl Esters 1 g Oral Cap Take 1 g by mouth daily.  Disp:  Rfl:    BD PEN NEEDLE SHORT U/F 31G X 8 MM Does not apply Misc AS DIRECTED WITH INS adenopathy  LUNGS: clear to auscultation  CARDIO: RRR without murmur  GI: good BS's,no masses, HSM or tenderness  MUSCULOSKELETAL: back is not tender,FROM of the back  EXTREMITIES: no cyanosis, clubbing or edema  NEURO: Oriented times three,cranial nerves

## 2019-03-28 NOTE — TELEPHONE ENCOUNTER
LOV: 3/23/19 for general medical exam  A1C: 1/12/19 6.4      LANTUS SOLOSTAR 100 UNIT/ML Subcutaneous Solution Pen-injector 15 mL 0 10/2/2018    Sig :  INJECT 10 UNITS UNDER THE SKIN NIGHTLY     Route:   (none)       No future appointments. Please advise.

## 2019-03-29 RX ORDER — INSULIN GLARGINE 100 [IU]/ML
INJECTION, SOLUTION SUBCUTANEOUS
Qty: 15 ML | Refills: 0 | Status: SHIPPED | OUTPATIENT
Start: 2019-03-29 | End: 2019-08-14

## 2019-05-15 NOTE — TELEPHONE ENCOUNTER
LOV 3/23/19 for a general exam.    Fexofenadine-Pseudoephed ER (ALLEGRA-D ALLERGY & CONGESTION) 180-240 MG Oral Tablet 24 Hr 90 tablet 0 2/1/2019    Sig:   Take 1 tablet by mouth once daily. Route:   Oral       No future appointments.

## 2019-05-17 RX ORDER — FEXOFENADINE HYDROCHLORIDE AND PSEUDOEPHEDRINE HYDROCHLORIDE 180; 240 MG/1; MG/1
TABLET, FILM COATED, EXTENDED RELEASE ORAL
Qty: 90 TABLET | Refills: 0 | Status: SHIPPED
Start: 2019-05-17 | End: 2019-12-04

## 2019-05-22 NOTE — TELEPHONE ENCOUNTER
LOV 3/23/19 for a general exam.  BP at that visit: 122/80. Last labs done: 1/12/19. All filled last on 2/22/19 for 90d. No future appointments. Please advise.

## 2019-05-24 RX ORDER — LISINOPRIL AND HYDROCHLOROTHIAZIDE 25; 20 MG/1; MG/1
1 TABLET ORAL DAILY
Qty: 90 TABLET | Refills: 0 | Status: SHIPPED | OUTPATIENT
Start: 2019-05-24 | End: 2019-08-20

## 2019-05-24 RX ORDER — ATORVASTATIN CALCIUM 80 MG/1
TABLET, FILM COATED ORAL
Qty: 90 TABLET | Refills: 0 | Status: SHIPPED | OUTPATIENT
Start: 2019-05-24 | End: 2019-08-20

## 2019-06-17 NOTE — TELEPHONE ENCOUNTER
LOV 3/23/19 for a general exam.  A1C on 1/12/19: 6.4.     Medication Quantity Refills Start End   BD PEN NEEDLE SHORT U/F 31G X 8 MM Does not apply Misc 100 each 1 11/30/2018    Sig:   AS DIRECTED WITH INSULIN     Route:   (none)       No future appointment

## 2019-06-18 RX ORDER — PEN NEEDLE, DIABETIC 31 GX5/16"
NEEDLE, DISPOSABLE MISCELLANEOUS
Qty: 100 EACH | Refills: 0 | Status: SHIPPED | OUTPATIENT
Start: 2019-06-18 | End: 2019-09-28

## 2019-08-15 ENCOUNTER — PATIENT MESSAGE (OUTPATIENT)
Dept: FAMILY MEDICINE CLINIC | Facility: CLINIC | Age: 49
End: 2019-08-15

## 2019-08-15 NOTE — TELEPHONE ENCOUNTER
From: Krystina Ivy. To: Lydia Waldrop DO  Sent: 8/15/2019 11:33 AM CDT  Subject: Prescription Question    Need insulin prescription renewed. [facility] in [facility] on [address].

## 2019-08-15 NOTE — TELEPHONE ENCOUNTER
LOV: 3/23/19 for general medical  A1C: 1/12/19    LANTUS SOLOSTAR 100 UNIT/ML Subcutaneous Solution Pen-injector 15 mL 0 3/29/2019    Sig:   INJECT 10 UNITS UNDER THE SKIN NIGHTLY     Route:   (none)       No future appointments. Please advise.

## 2019-08-16 RX ORDER — INSULIN GLARGINE 100 [IU]/ML
INJECTION, SOLUTION SUBCUTANEOUS
Qty: 15 ML | Refills: 0 | Status: SHIPPED | OUTPATIENT
Start: 2019-08-16 | End: 2019-10-07

## 2019-08-20 RX ORDER — LISINOPRIL AND HYDROCHLOROTHIAZIDE 25; 20 MG/1; MG/1
1 TABLET ORAL DAILY
Qty: 90 TABLET | Refills: 0 | Status: SHIPPED | OUTPATIENT
Start: 2019-08-20 | End: 2019-11-04

## 2019-08-20 RX ORDER — ATORVASTATIN CALCIUM 80 MG/1
TABLET, FILM COATED ORAL
Qty: 90 TABLET | Refills: 0 | Status: SHIPPED | OUTPATIENT
Start: 2019-08-20 | End: 2019-11-04

## 2019-08-20 NOTE — TELEPHONE ENCOUNTER
LOV: 3/23/19 for general medical exam  A1C: 1/12/19      ATORVASTATIN 80 MG Oral Tab 90 tablet 0 5/24/2019    Sig:   TAKE 1 TABLET BY MOUTH  DAILY     Route:   (none)       METFORMIN  MG Oral Tab 180 tablet 0 5/24/2019    Sig:   TAKE 1 TABLET BY ERIKA

## 2019-09-24 ENCOUNTER — TELEPHONE (OUTPATIENT)
Dept: FAMILY MEDICINE CLINIC | Facility: CLINIC | Age: 49
End: 2019-09-24

## 2019-09-24 NOTE — TELEPHONE ENCOUNTER
Pt called according to christine pt is due for a diabetic f/u but not sure if he will need to fast when he schedules an apt.

## 2019-09-28 NOTE — TELEPHONE ENCOUNTER
LOV 3/23/19 for a general exam.  Last A1C: 1/12/19 - 6.4    BD PEN NEEDLE SHORT U/F 31G X 8 MM Does not apply Misc 100 each 0 6/18/2019    Sig:   AS DIRECTED WITH INSULIN     Route:   (none)       Future Appointments   Date Time Provider Maximiliano Rdz

## 2019-09-30 RX ORDER — PEN NEEDLE, DIABETIC 31 GX5/16"
NEEDLE, DISPOSABLE MISCELLANEOUS
Qty: 100 EACH | Refills: 1 | Status: SHIPPED | OUTPATIENT
Start: 2019-09-30 | End: 2020-03-25

## 2019-10-07 ENCOUNTER — OFFICE VISIT (OUTPATIENT)
Dept: FAMILY MEDICINE CLINIC | Facility: CLINIC | Age: 49
End: 2019-10-07
Payer: COMMERCIAL

## 2019-10-07 VITALS
SYSTOLIC BLOOD PRESSURE: 126 MMHG | BODY MASS INDEX: 32.96 KG/M2 | WEIGHT: 220 LBS | RESPIRATION RATE: 18 BRPM | HEART RATE: 83 BPM | TEMPERATURE: 99 F | OXYGEN SATURATION: 98 % | HEIGHT: 68.5 IN | DIASTOLIC BLOOD PRESSURE: 84 MMHG

## 2019-10-07 DIAGNOSIS — I10 ESSENTIAL HYPERTENSION: ICD-10-CM

## 2019-10-07 DIAGNOSIS — Z23 NEED FOR VACCINATION: ICD-10-CM

## 2019-10-07 DIAGNOSIS — H10.13 ALLERGIC CONJUNCTIVITIS OF BOTH EYES: ICD-10-CM

## 2019-10-07 DIAGNOSIS — E78.2 MIXED HYPERLIPIDEMIA: ICD-10-CM

## 2019-10-07 DIAGNOSIS — E11.9 TYPE 2 DIABETES MELLITUS WITHOUT COMPLICATION, WITH LONG-TERM CURRENT USE OF INSULIN (HCC): Primary | ICD-10-CM

## 2019-10-07 DIAGNOSIS — Z79.4 TYPE 2 DIABETES MELLITUS WITHOUT COMPLICATION, WITH LONG-TERM CURRENT USE OF INSULIN (HCC): Primary | ICD-10-CM

## 2019-10-07 PROCEDURE — 83036 HEMOGLOBIN GLYCOSYLATED A1C: CPT | Performed by: FAMILY MEDICINE

## 2019-10-07 PROCEDURE — 90686 IIV4 VACC NO PRSV 0.5 ML IM: CPT | Performed by: FAMILY MEDICINE

## 2019-10-07 PROCEDURE — 83721 ASSAY OF BLOOD LIPOPROTEIN: CPT | Performed by: FAMILY MEDICINE

## 2019-10-07 PROCEDURE — 82550 ASSAY OF CK (CPK): CPT | Performed by: FAMILY MEDICINE

## 2019-10-07 PROCEDURE — 90471 IMMUNIZATION ADMIN: CPT | Performed by: FAMILY MEDICINE

## 2019-10-07 PROCEDURE — 84450 TRANSFERASE (AST) (SGOT): CPT | Performed by: FAMILY MEDICINE

## 2019-10-07 PROCEDURE — 99214 OFFICE O/P EST MOD 30 MIN: CPT | Performed by: FAMILY MEDICINE

## 2019-10-07 PROCEDURE — 84460 ALANINE AMINO (ALT) (SGPT): CPT | Performed by: FAMILY MEDICINE

## 2019-10-07 RX ORDER — OLOPATADINE HYDROCHLORIDE 1 MG/ML
SOLUTION/ DROPS OPHTHALMIC
Qty: 3 BOTTLE | Refills: 1 | Status: SHIPPED
Start: 2019-10-07 | End: 2020-04-28

## 2019-10-07 RX ORDER — OLOPATADINE HYDROCHLORIDE 1 MG/ML
SOLUTION/ DROPS OPHTHALMIC
COMMUNITY
Start: 2019-08-22 | End: 2019-10-07

## 2019-10-07 NOTE — PROGRESS NOTES
CC: follow up multiple issues    HPI:     1. Type 2 diabetes mellitus without complication, with long-term current use of insulin (HCC)  Chronic stable compliant with medication. Trying to avoid carbs. Trying to stay active.     2. Essential hypertension ONCE DAILY, AS DIRECTED Disp: 100 strip Rfl: 1   Omega-3-acid Ethyl Esters 1 g Oral Cap Take 1 g by mouth daily.  Disp:  Rfl:        Past Medical History:   Diagnosis Date   [facility]    • Essential hypertension        Social History    Tobacco Use Bottle; Refill: 1    No orders of the defined types were placed in this encounter.       Meds & Refills for this Visit:  Requested Prescriptions     Pending Prescriptions Disp Refills   • Olopatadine HCl 0.1 % Ophthalmic Solution  0     Si-2 DROPS TO EY

## 2019-11-05 RX ORDER — LISINOPRIL AND HYDROCHLOROTHIAZIDE 25; 20 MG/1; MG/1
1 TABLET ORAL DAILY
Qty: 90 TABLET | Refills: 0 | Status: SHIPPED | OUTPATIENT
Start: 2019-11-05 | End: 2020-02-04

## 2019-11-05 RX ORDER — ATORVASTATIN CALCIUM 80 MG/1
TABLET, FILM COATED ORAL
Qty: 90 TABLET | Refills: 0 | Status: SHIPPED | OUTPATIENT
Start: 2019-11-05 | End: 2020-02-04

## 2019-11-05 NOTE — TELEPHONE ENCOUNTER
All meds Last refilled on 8/20/19 for # 90 with 0 refills  Last a1c 6.5 on 10/7/19  Last OV 10/7/19, /84  No future appointments. Thank you.

## 2019-11-25 NOTE — TELEPHONE ENCOUNTER
Last refilled on 10/7/19 for # 15mL with 0 refills  Last a1c 6.5 on 10/7/19  Last OV 10/7/19  No future appointments. Thank you.

## 2019-11-27 NOTE — TELEPHONE ENCOUNTER
Received refill request for lantus. Last refilled on 10/7/19 for # 15mL with 0 refills  Last a1c 6.5 on 10/7/19  Last OV 10/7/19  No future appointments. Thank you.

## 2019-11-28 RX ORDER — INSULIN GLARGINE 100 [IU]/ML
INJECTION, SOLUTION SUBCUTANEOUS
Qty: 15 ML | Refills: 0 | Status: SHIPPED | OUTPATIENT
Start: 2019-11-28 | End: 2020-04-28

## 2019-12-04 RX ORDER — FEXOFENADINE HYDROCHLORIDE AND PSEUDOEPHEDRINE HYDROCHLORIDE 180; 240 MG/1; MG/1
TABLET, FILM COATED, EXTENDED RELEASE ORAL
Qty: 30 TABLET | Refills: 0 | Status: SHIPPED
Start: 2019-12-04 | End: 2020-03-25

## 2019-12-04 NOTE — TELEPHONE ENCOUNTER
Pt states he takes this 5 out of 7 days of the week usually. He has tried plain Allegra and it does not work for him.

## 2019-12-04 NOTE — TELEPHONE ENCOUNTER
LOV 10/7/19 for DM, HTN, hyperlipidemia, allergic conjunctivitis. ALLEGRA-D ALLERGY & CONGESTION 180-240 MG Oral Tablet 24 Hr 90 tablet 0 5/17/2019    Sig:   TAKE 1 TABLET BY MOUTH DAILY     Route:   (none)       No future appointments.

## 2020-02-03 NOTE — TELEPHONE ENCOUNTER
LOV 10/7/19 for DM, HTN, hyperlipidemia. BP: 126/84  Last labs done: 10/7/19  A1C: 6.5    All 3 last filled on 11/5/19 for 90d. No future appointments. Please advise.

## 2020-02-04 RX ORDER — LISINOPRIL AND HYDROCHLOROTHIAZIDE 25; 20 MG/1; MG/1
1 TABLET ORAL DAILY
Qty: 90 TABLET | Refills: 0 | Status: SHIPPED | OUTPATIENT
Start: 2020-02-04 | End: 2020-04-28

## 2020-02-04 RX ORDER — ATORVASTATIN CALCIUM 80 MG/1
TABLET, FILM COATED ORAL
Qty: 90 TABLET | Refills: 0 | Status: SHIPPED | OUTPATIENT
Start: 2020-02-04 | End: 2020-04-28

## 2020-03-25 RX ORDER — PEN NEEDLE, DIABETIC 31 GX5/16"
NEEDLE, DISPOSABLE MISCELLANEOUS
Qty: 100 EACH | Refills: 1 | Status: SHIPPED | OUTPATIENT
Start: 2020-03-25 | End: 2020-04-28

## 2020-03-25 NOTE — TELEPHONE ENCOUNTER
Medication Quantity Refills Start End   ALLEGRA-D ALLERGY & CONGESTION 180-240 MG Oral Tablet 24 Hr 30 tablet 0 12/4/2019      Last OV 10/7/19 for DM  No future appointments. Please advise.  Thank you

## 2020-03-25 NOTE — TELEPHONE ENCOUNTER
Last refilled on 9/30/19 for # 100 with 1 refills  Last OV 10/7/19 for DM  Refill sent per protocol        Diabetic Supplies Protocol Passed3/25 9:45 AM   Appointment in the past 12 or next 3 months

## 2020-03-26 RX ORDER — FEXOFENADINE HYDROCHLORIDE AND PSEUDOEPHEDRINE HYDROCHLORIDE 180; 240 MG/1; MG/1
TABLET, FILM COATED, EXTENDED RELEASE ORAL
Qty: 90 TABLET | Refills: 0 | OUTPATIENT
Start: 2020-03-26 | End: 2021-08-10

## 2020-04-24 ENCOUNTER — TELEPHONE (OUTPATIENT)
Dept: FAMILY MEDICINE CLINIC | Facility: CLINIC | Age: 50
End: 2020-04-24

## 2020-04-24 NOTE — TELEPHONE ENCOUNTER
Ellen Brown. verbally consents to a Virtual/Telephone Check-In service on 4/28/20.   Patient understands and accepts financial responsibility for any deductible, co-insurance and/or co-pays associated with this service      Future Appointments   Date Ti

## 2020-04-28 ENCOUNTER — TELEPHONE (OUTPATIENT)
Dept: FAMILY MEDICINE CLINIC | Facility: CLINIC | Age: 50
End: 2020-04-28

## 2020-04-28 ENCOUNTER — TELEMEDICINE (OUTPATIENT)
Dept: FAMILY MEDICINE CLINIC | Facility: CLINIC | Age: 50
End: 2020-04-28

## 2020-04-28 VITALS — BODY MASS INDEX: 33 KG/M2 | WEIGHT: 220 LBS

## 2020-04-28 DIAGNOSIS — I10 ESSENTIAL HYPERTENSION: ICD-10-CM

## 2020-04-28 DIAGNOSIS — E11.9 TYPE 2 DIABETES MELLITUS WITHOUT COMPLICATION, WITH LONG-TERM CURRENT USE OF INSULIN (HCC): Primary | ICD-10-CM

## 2020-04-28 DIAGNOSIS — E78.2 MIXED HYPERLIPIDEMIA: ICD-10-CM

## 2020-04-28 DIAGNOSIS — H10.13 ALLERGIC CONJUNCTIVITIS OF BOTH EYES: ICD-10-CM

## 2020-04-28 DIAGNOSIS — J30.89 ALLERGIC RHINITIS DUE TO OTHER ALLERGIC TRIGGER, UNSPECIFIED SEASONALITY: ICD-10-CM

## 2020-04-28 DIAGNOSIS — Z79.4 TYPE 2 DIABETES MELLITUS WITHOUT COMPLICATION, WITH LONG-TERM CURRENT USE OF INSULIN (HCC): Primary | ICD-10-CM

## 2020-04-28 PROCEDURE — 99214 OFFICE O/P EST MOD 30 MIN: CPT | Performed by: FAMILY MEDICINE

## 2020-04-28 RX ORDER — OLOPATADINE HYDROCHLORIDE 1 MG/ML
SOLUTION/ DROPS OPHTHALMIC
Qty: 3 BOTTLE | Refills: 1 | Status: SHIPPED | OUTPATIENT
Start: 2020-04-28 | End: 2021-01-21

## 2020-04-28 RX ORDER — ATORVASTATIN CALCIUM 80 MG/1
80 TABLET, FILM COATED ORAL DAILY
Qty: 90 TABLET | Refills: 1 | Status: SHIPPED | OUTPATIENT
Start: 2020-04-28 | End: 2020-10-16

## 2020-04-28 RX ORDER — LISINOPRIL AND HYDROCHLOROTHIAZIDE 25; 20 MG/1; MG/1
1 TABLET ORAL DAILY
Qty: 90 TABLET | Refills: 1 | Status: SHIPPED | OUTPATIENT
Start: 2020-04-28 | End: 2020-10-16

## 2020-04-28 NOTE — PROGRESS NOTES
CC: meds check    HPI:   Type 2 diabetes mellitus without complication, with long-term current use of insulin (HCC)  Chronic, stable. SMBG flucutaion - range . Essential hypertension  Chronic, stable, compliant. 130s/80s.      Mixed hyperlipidemia mg total) by mouth daily as needed for Erectile Dysfunction.  (Patient not taking: Reported on 4/28/2020 ) 24 tablet 0       Past Medical History:   Diagnosis Date   • Diabetes [facility])    • Essential hypertension        Social History    Tobacco Use      Smok of the defined types were placed in this encounter.       Meds & Refills for this Visit:  Requested Prescriptions     Signed Prescriptions Disp Refills   • Insulin Pen Needle (BD PEN NEEDLE SHORT U/F) 31G X 8 MM Does not apply Misc 100 each 1     Sig: USE W

## 2020-04-28 NOTE — TELEPHONE ENCOUNTER
Spoke to patient. Patient wants to keep as 2 drops into both eyes twice daily. Signed form faxed back to [employer] at [phone]    Copy sent to scan.

## 2020-04-28 NOTE — TELEPHONE ENCOUNTER
Received fax from [address] [place] [address] to clarify \"the ATYPICAL DIRECTIONS for the prescription for OLAPATADINE OP SOL 0.1%. Typically dosed 1 drop into affected eye(s) twice daily. \"    Medication Quantity Refills Start End   Olopatadine HCl 0.1 % Ophthalmic Solutio

## 2020-06-04 ENCOUNTER — TELEPHONE (OUTPATIENT)
Dept: FAMILY MEDICINE CLINIC | Facility: CLINIC | Age: 50
End: 2020-06-04

## 2020-06-04 DIAGNOSIS — Z79.4 TYPE 2 DIABETES MELLITUS WITHOUT COMPLICATION, WITH LONG-TERM CURRENT USE OF INSULIN (HCC): Primary | ICD-10-CM

## 2020-06-04 DIAGNOSIS — E11.9 TYPE 2 DIABETES MELLITUS WITHOUT COMPLICATION, WITH LONG-TERM CURRENT USE OF INSULIN (HCC): Primary | ICD-10-CM

## 2020-06-04 DIAGNOSIS — E78.2 MIXED HYPERLIPIDEMIA: ICD-10-CM

## 2020-06-04 NOTE — TELEPHONE ENCOUNTER
----- Message from Leandro Ann RN sent at 4/28/2020  9:30 AM CDT -----  Patient needs DM and cholesterol blood work done in June

## 2020-06-11 NOTE — TELEPHONE ENCOUNTER
Spoke to patient. Scheduled RN visit. Advised to fast and call from back of office when he arrives. Patient verbalized understanding.      Future Appointments   Date Time Provider Maximiliano Rdz   6/13/2020  8:45 AM [facility] [place] NURSE EMGROLANDOW [facility] Kevin Altamirano

## 2020-06-13 ENCOUNTER — NURSE ONLY (OUTPATIENT)
Dept: FAMILY MEDICINE CLINIC | Facility: CLINIC | Age: 50
End: 2020-06-13
Payer: COMMERCIAL

## 2020-06-13 DIAGNOSIS — E78.2 MIXED HYPERLIPIDEMIA: ICD-10-CM

## 2020-06-13 DIAGNOSIS — Z79.4 TYPE 2 DIABETES MELLITUS WITHOUT COMPLICATION, WITH LONG-TERM CURRENT USE OF INSULIN (HCC): ICD-10-CM

## 2020-06-13 DIAGNOSIS — E11.9 TYPE 2 DIABETES MELLITUS WITHOUT COMPLICATION, WITH LONG-TERM CURRENT USE OF INSULIN (HCC): ICD-10-CM

## 2020-06-13 PROCEDURE — 83036 HEMOGLOBIN GLYCOSYLATED A1C: CPT | Performed by: FAMILY MEDICINE

## 2020-06-13 PROCEDURE — 80061 LIPID PANEL: CPT | Performed by: FAMILY MEDICINE

## 2020-06-13 NOTE — PROGRESS NOTES
Jacquelyn Baldwin. presents today for nurse visit. Labs ordered by Dr. Antoinette Decker. 1 green, 1 lavendar drawn from right [facility] area with straight needle. Patient tolerated well. Left office in stable condition.

## 2020-06-16 ENCOUNTER — VIRTUAL PHONE E/M (OUTPATIENT)
Dept: FAMILY MEDICINE CLINIC | Facility: CLINIC | Age: 50
End: 2020-06-16
Payer: COMMERCIAL

## 2020-06-16 ENCOUNTER — TELEPHONE (OUTPATIENT)
Dept: FAMILY MEDICINE CLINIC | Facility: CLINIC | Age: 50
End: 2020-06-16

## 2020-06-16 DIAGNOSIS — Z71.1 WORRIED WELL: Primary | ICD-10-CM

## 2020-06-16 PROCEDURE — 99212 OFFICE O/P EST SF 10 MIN: CPT | Performed by: FAMILY MEDICINE

## 2020-06-16 NOTE — TELEPHONE ENCOUNTER
Patient advised. Scheduled televisit. Verified insurance.    Future Appointments   Date Time Provider Maximiliano Rdz   6/16/2020  4:00 PM Ton Barney DO [facility] Stephan Kay. verbally consents to a Virtual/Telephone Check-In service o

## 2020-06-16 NOTE — TELEPHONE ENCOUNTER
Spoke to patient. Currently has no sx. Was very sick in February. Requesting covid ab test order. Advised him to call insurance to make sure it is covered. Wants order sent to [facility] at    [address] [facility], [place]    Please advise.

## 2020-06-16 NOTE — PROGRESS NOTES
Virtual Telephone Check-In    Amy Pires. verbally consents to a Virtual/Telephone Check-In visit on 06/16/20. Patient has been referred to the [facility] website at [email or web address] to review the yearly Consent to Treat document.     Patient unders

## 2020-06-16 NOTE — TELEPHONE ENCOUNTER
Pt left vm, states he wants to get an order for covid test, states he is having some issues,  Pt did not state what issues are.   Please call

## 2020-07-02 ENCOUNTER — PATIENT MESSAGE (OUTPATIENT)
Dept: FAMILY MEDICINE CLINIC | Facility: CLINIC | Age: 50
End: 2020-07-02

## 2020-07-02 RX ORDER — SILDENAFIL 50 MG/1
50 TABLET, FILM COATED ORAL
Qty: 8 TABLET | Refills: 0 | Status: SHIPPED | OUTPATIENT
Start: 2020-07-02 | End: 2021-05-14

## 2020-07-02 NOTE — TELEPHONE ENCOUNTER
Last refilled on 3/23/19 for # 24 with 0 refills  Last OV televisit 6/16/20  No future appointments. Thank you.

## 2020-07-02 NOTE — TELEPHONE ENCOUNTER
From: Na Mata.   To: Evonne Kathleen DO  Sent: 7/2/2020 10:07 AM CDT  Subject: Prescription Question    I believe Marleny sent a request

## 2020-10-09 ENCOUNTER — HOSPITAL ENCOUNTER (OUTPATIENT)
Dept: CT IMAGING | Age: 50
Discharge: HOME | End: 2020-10-09
Attending: FAMILY MEDICINE

## 2020-10-09 DIAGNOSIS — Z13.9 VISIT FOR SCREENING: ICD-10-CM

## 2020-10-15 DIAGNOSIS — E11.9 TYPE 2 DIABETES MELLITUS WITHOUT COMPLICATION, WITH LONG-TERM CURRENT USE OF INSULIN (HCC): ICD-10-CM

## 2020-10-15 DIAGNOSIS — Z79.4 TYPE 2 DIABETES MELLITUS WITHOUT COMPLICATION, WITH LONG-TERM CURRENT USE OF INSULIN (HCC): ICD-10-CM

## 2020-10-15 DIAGNOSIS — I10 ESSENTIAL HYPERTENSION: Primary | ICD-10-CM

## 2020-10-15 DIAGNOSIS — E78.2 MIXED HYPERLIPIDEMIA: ICD-10-CM

## 2020-10-16 RX ORDER — LISINOPRIL AND HYDROCHLOROTHIAZIDE 25; 20 MG/1; MG/1
1 TABLET ORAL DAILY
Qty: 90 TABLET | Refills: 0 | Status: SHIPPED | OUTPATIENT
Start: 2020-10-16 | End: 2021-01-13

## 2020-10-16 RX ORDER — ATORVASTATIN CALCIUM 80 MG/1
TABLET, FILM COATED ORAL
Qty: 90 TABLET | Refills: 0 | Status: SHIPPED | OUTPATIENT
Start: 2020-10-16 | End: 2021-01-05

## 2020-10-16 NOTE — TELEPHONE ENCOUNTER
Spoke with pt  Pt notify  Bringing pt sent okay per protocol  Apt scheduled.   Future Appointments   Date Time Provider Maximiliano Rdz   10/22/2020  2:00 PM [facility] [place] NURSE [facility] [place]     cmp ordered

## 2020-10-16 NOTE — TELEPHONE ENCOUNTER
LOV: 6/16/20 for worried well (telemedicine visit)  A1C: 6/13/20  Patient fails protocol due to not having a recent CMP  LM to schedule nurse visit for CMP    Hypertension Medications Protocol Ghqlry31/15/2020 09:26 PM   CMP or BMP in past 12 months Protoc

## 2020-10-22 ENCOUNTER — NURSE ONLY (OUTPATIENT)
Dept: FAMILY MEDICINE CLINIC | Facility: CLINIC | Age: 50
End: 2020-10-22
Payer: COMMERCIAL

## 2020-10-22 DIAGNOSIS — E11.9 TYPE 2 DIABETES MELLITUS WITHOUT COMPLICATION, WITH LONG-TERM CURRENT USE OF INSULIN (HCC): ICD-10-CM

## 2020-10-22 DIAGNOSIS — Z79.4 TYPE 2 DIABETES MELLITUS WITHOUT COMPLICATION, WITH LONG-TERM CURRENT USE OF INSULIN (HCC): ICD-10-CM

## 2020-10-22 DIAGNOSIS — I10 ESSENTIAL HYPERTENSION: ICD-10-CM

## 2020-10-22 DIAGNOSIS — E78.2 MIXED HYPERLIPIDEMIA: ICD-10-CM

## 2020-10-22 DIAGNOSIS — Z23 NEED FOR VACCINATION: ICD-10-CM

## 2020-10-22 PROCEDURE — 90471 IMMUNIZATION ADMIN: CPT | Performed by: FAMILY MEDICINE

## 2020-10-22 PROCEDURE — 90686 IIV4 VACC NO PRSV 0.5 ML IM: CPT | Performed by: FAMILY MEDICINE

## 2020-10-22 PROCEDURE — 80053 COMPREHEN METABOLIC PANEL: CPT | Performed by: FAMILY MEDICINE

## 2020-10-22 NOTE — PROGRESS NOTES
Discharge instructions went over with patient    Electronically signed by Emmy Cyr RN on 11/16/2018 at 12:45 PM Erica Germain. presents today for nurse visit. Labs ordered by Dr. Cathy Reyes. Green tube drawn from right ac area with a butterfly. Patient tolerated well. Left office in stable condition. Flu vaccine given in left deltoid.   [employer]

## 2020-11-02 RX ORDER — INSULIN GLARGINE 100 [IU]/ML
INJECTION, SOLUTION SUBCUTANEOUS
Qty: 15 ML | Refills: 3 | Status: SHIPPED | OUTPATIENT
Start: 2020-11-02 | End: 2021-11-29

## 2020-11-02 NOTE — TELEPHONE ENCOUNTER
Last refilled on 4/28/20 for # 15mL with 1 refills  Last labs a1c 6.1 on 6/13/20  Last OV televisit 6/16/20  No future appointments. Thank you.

## 2020-11-05 RX ORDER — FEXOFENADINE HYDROCHLORIDE AND PSEUDOEPHEDRINE HYDROCHLORIDE 180; 240 MG/1; MG/1
TABLET, FILM COATED, EXTENDED RELEASE ORAL
Qty: 30 TABLET | Refills: 0 | OUTPATIENT
Start: 2020-11-05

## 2020-11-05 NOTE — TELEPHONE ENCOUNTER
Last refilled: 3/26/20 - 90 tab - 0 refills    Last OV: 6/16/20 - telemed - worried well - [place]      No future appts    Please advise.

## 2021-01-13 ENCOUNTER — TELEPHONE (OUTPATIENT)
Dept: FAMILY MEDICINE CLINIC | Facility: CLINIC | Age: 51
End: 2021-01-13

## 2021-01-13 RX ORDER — LISINOPRIL AND HYDROCHLOROTHIAZIDE 25; 20 MG/1; MG/1
1 TABLET ORAL DAILY
Qty: 90 TABLET | Refills: 0 | Status: SHIPPED | OUTPATIENT
Start: 2021-01-13 | End: 2021-03-27

## 2021-01-13 RX ORDER — ATORVASTATIN CALCIUM 80 MG/1
80 TABLET, FILM COATED ORAL DAILY
Qty: 90 TABLET | Refills: 0 | Status: SHIPPED | OUTPATIENT
Start: 2021-01-13 | End: 2021-03-27

## 2021-01-13 NOTE — TELEPHONE ENCOUNTER
Call from patient. Was seen last week at [facility] [address] for chest tightness. Did EKG and chest xray was told to f/u with PCP for repeat chest xray. No fever, no cough, no SOB, no covid exp. Advised needs to scheduled IC f/u with Dr Yohannes Gunter for 1/18.  Ad

## 2021-01-13 NOTE — TELEPHONE ENCOUNTER
Pt went in to IC was told to see his pcp because he will need a second xray to be ordered he has a Px scheduled 01/23

## 2021-01-13 NOTE — TELEPHONE ENCOUNTER
Pt notified and verbalized understanding. OV scheduled. Future Appointments   Date Time Provider Maximiliano Rdz   1/23/2021  9:20 AM Jatin Mckeon MD [facility] [place]      Rx's filled.

## 2021-01-18 ENCOUNTER — OFFICE VISIT (OUTPATIENT)
Dept: FAMILY MEDICINE CLINIC | Facility: CLINIC | Age: 51
End: 2021-01-18
Payer: COMMERCIAL

## 2021-01-18 VITALS
HEIGHT: 70 IN | RESPIRATION RATE: 18 BRPM | TEMPERATURE: 97 F | HEART RATE: 73 BPM | SYSTOLIC BLOOD PRESSURE: 128 MMHG | OXYGEN SATURATION: 97 % | DIASTOLIC BLOOD PRESSURE: 82 MMHG | BODY MASS INDEX: 32.5 KG/M2 | WEIGHT: 227 LBS

## 2021-01-18 DIAGNOSIS — R91.8 MULTILOBAR LUNG INFILTRATE: Primary | ICD-10-CM

## 2021-01-18 PROCEDURE — 99213 OFFICE O/P EST LOW 20 MIN: CPT | Performed by: FAMILY MEDICINE

## 2021-01-18 PROCEDURE — 3008F BODY MASS INDEX DOCD: CPT | Performed by: FAMILY MEDICINE

## 2021-01-18 PROCEDURE — 3074F SYST BP LT 130 MM HG: CPT | Performed by: FAMILY MEDICINE

## 2021-01-18 PROCEDURE — 3079F DIAST BP 80-89 MM HG: CPT | Performed by: FAMILY MEDICINE

## 2021-01-18 RX ORDER — FLUTICASONE PROPIONATE 50 MCG
SPRAY, SUSPENSION (ML) NASAL DAILY
COMMUNITY
End: 2021-08-10

## 2021-01-18 NOTE — PROGRESS NOTES
Ic follow up for chest pain    HPI:    Patient ID: Ely Kumar. is a 48year old male. HPI   Chest tightness went to IC at [facility] on 2 wks ago. Patient had chest pain for the sternal area and right side.   Chest x-ray and EKG done were normal.  No Oral Cap Take 1 g by mouth daily.      • Insulin Pen Needle (BD PEN NEEDLE SHORT U/F) 31G X 8 MM Does not apply Misc USE WITH INSULIN AS DIRECTED 100 each 1   • Olopatadine HCl 0.1 % Ophthalmic Solution 2 DROPS TO both EYES TWICE DAILY 3 Bottle 1     Allerg

## 2021-01-21 DIAGNOSIS — H10.13 ALLERGIC CONJUNCTIVITIS OF BOTH EYES: ICD-10-CM

## 2021-01-21 RX ORDER — OLOPATADINE HYDROCHLORIDE 1 MG/ML
SOLUTION/ DROPS OPHTHALMIC
Qty: 3 BOTTLE | Refills: 1 | Status: SHIPPED | OUTPATIENT
Start: 2021-01-21 | End: 2021-01-23

## 2021-01-21 RX ORDER — PEN NEEDLE, DIABETIC 31 GX5/16"
NEEDLE, DISPOSABLE MISCELLANEOUS
Qty: 100 EACH | Refills: 1 | Status: SHIPPED | OUTPATIENT
Start: 2021-01-21 | End: 2021-01-23

## 2021-01-21 NOTE — TELEPHONE ENCOUNTER
Needles Last refilled on 4/28/20 for # 100 with 1 refills  Eye drops 4/28/20 3 bottle 1 refill  Last OV 1/18/21  Future Appointments   Date Time Provider Maximiliano Rdz   1/23/2021  9:20 AM Eugene Mazariegos MD [facility] [place]        Thank you.

## 2021-01-23 ENCOUNTER — OFFICE VISIT (OUTPATIENT)
Dept: FAMILY MEDICINE CLINIC | Facility: CLINIC | Age: 51
End: 2021-01-23
Payer: COMMERCIAL

## 2021-01-23 ENCOUNTER — HOSPITAL ENCOUNTER (OUTPATIENT)
Dept: GENERAL RADIOLOGY | Age: 51
Discharge: HOME | End: 2021-01-23
Attending: FAMILY MEDICINE
Payer: COMMERCIAL

## 2021-01-23 VITALS
RESPIRATION RATE: 20 BRPM | WEIGHT: 226 LBS | DIASTOLIC BLOOD PRESSURE: 78 MMHG | TEMPERATURE: 97 F | BODY MASS INDEX: 32.35 KG/M2 | SYSTOLIC BLOOD PRESSURE: 118 MMHG | OXYGEN SATURATION: 98 % | HEIGHT: 70 IN | HEART RATE: 65 BPM

## 2021-01-23 DIAGNOSIS — E78.2 MIXED HYPERLIPIDEMIA: ICD-10-CM

## 2021-01-23 DIAGNOSIS — I10 ESSENTIAL HYPERTENSION: ICD-10-CM

## 2021-01-23 DIAGNOSIS — Z00.00 ANNUAL PHYSICAL EXAM: Primary | ICD-10-CM

## 2021-01-23 DIAGNOSIS — E11.9 TYPE 2 DIABETES MELLITUS WITHOUT COMPLICATION, WITH LONG-TERM CURRENT USE OF INSULIN (HCC): ICD-10-CM

## 2021-01-23 DIAGNOSIS — Z12.11 ENCOUNTER FOR SCREENING COLONOSCOPY: ICD-10-CM

## 2021-01-23 DIAGNOSIS — H10.13 ALLERGIC CONJUNCTIVITIS OF BOTH EYES: ICD-10-CM

## 2021-01-23 DIAGNOSIS — R91.8 MULTILOBAR LUNG INFILTRATE: ICD-10-CM

## 2021-01-23 DIAGNOSIS — Z79.4 TYPE 2 DIABETES MELLITUS WITHOUT COMPLICATION, WITH LONG-TERM CURRENT USE OF INSULIN (HCC): ICD-10-CM

## 2021-01-23 LAB
ALBUMIN SERPL-MCNC: 3.8 G/DL (ref 3.4–5)
ALBUMIN/GLOB SERPL: 0.9 {RATIO} (ref 1–2)
ALP LIVER SERPL-CCNC: 90 U/L
ALT SERPL-CCNC: 30 U/L
ANION GAP SERPL CALC-SCNC: 4 MMOL/L (ref 0–18)
AST SERPL-CCNC: 21 U/L (ref 15–37)
BASOPHILS # BLD AUTO: 0.06 X10(3) UL (ref 0–0.2)
BASOPHILS NFR BLD AUTO: 0.9 %
BILIRUB SERPL-MCNC: 1.1 MG/DL (ref 0.1–2)
BUN BLD-MCNC: 20 MG/DL (ref 7–18)
BUN/CREAT SERPL: 17.9 (ref 10–20)
CALCIUM BLD-MCNC: 9.4 MG/DL (ref 8.5–10.1)
CHLORIDE SERPL-SCNC: 108 MMOL/L (ref 98–112)
CHOLEST SMN-MCNC: 168 MG/DL (ref ?–200)
CO2 SERPL-SCNC: 29 MMOL/L (ref 21–32)
COMPLEXED PSA SERPL-MCNC: 0.38 NG/ML (ref ?–4)
CREAT BLD-MCNC: 1.12 MG/DL
CREAT UR-SCNC: 263 MG/DL
DEPRECATED RDW RBC AUTO: 41.8 FL (ref 35.1–46.3)
EOSINOPHIL # BLD AUTO: 0.17 X10(3) UL (ref 0–0.7)
EOSINOPHIL NFR BLD AUTO: 2.5 %
ERYTHROCYTE [DISTWIDTH] IN BLOOD BY AUTOMATED COUNT: 12.9 % (ref 11–15)
EST. AVERAGE GLUCOSE BLD GHB EST-MCNC: 131 MG/DL (ref 68–126)
GLOBULIN PLAS-MCNC: 4.2 G/DL (ref 2.8–4.4)
GLUCOSE BLD-MCNC: 90 MG/DL (ref 70–99)
HBA1C MFR BLD HPLC: 6.2 % (ref ?–5.7)
HCT VFR BLD AUTO: 49 %
HDLC SERPL-MCNC: 42 MG/DL (ref 40–59)
HGB BLD-MCNC: 16.4 G/DL
IMM GRANULOCYTES # BLD AUTO: 0.02 X10(3) UL (ref 0–1)
IMM GRANULOCYTES NFR BLD: 0.3 %
LDLC SERPL CALC-MCNC: 117 MG/DL (ref ?–100)
LYMPHOCYTES # BLD AUTO: 1.7 X10(3) UL (ref 1–4)
LYMPHOCYTES NFR BLD AUTO: 25.1 %
M PROTEIN MFR SERPL ELPH: 8 G/DL (ref 6.4–8.2)
MCH RBC QN AUTO: 30.1 PG (ref 26–34)
MCHC RBC AUTO-ENTMCNC: 33.5 G/DL (ref 31–37)
MCV RBC AUTO: 89.9 FL
MICROALBUMIN UR-MCNC: 1.32 MG/DL
MICROALBUMIN/CREAT 24H UR-RTO: 5 UG/MG (ref ?–30)
MONOCYTES # BLD AUTO: 0.52 X10(3) UL (ref 0.1–1)
MONOCYTES NFR BLD AUTO: 7.7 %
NEUTROPHILS # BLD AUTO: 4.31 X10 (3) UL (ref 1.5–7.7)
NEUTROPHILS # BLD AUTO: 4.31 X10(3) UL (ref 1.5–7.7)
NEUTROPHILS NFR BLD AUTO: 63.5 %
NONHDLC SERPL-MCNC: 126 MG/DL (ref ?–130)
OSMOLALITY SERPL CALC.SUM OF ELEC: 294 MOSM/KG (ref 275–295)
PATIENT FASTING Y/N/NP: YES
PATIENT FASTING Y/N/NP: YES
PLATELET # BLD AUTO: 207 10(3)UL (ref 150–450)
POTASSIUM SERPL-SCNC: 3.8 MMOL/L (ref 3.5–5.1)
RBC # BLD AUTO: 5.45 X10(6)UL
SODIUM SERPL-SCNC: 141 MMOL/L (ref 136–145)
TRIGL SERPL-MCNC: 46 MG/DL (ref 30–149)
TSI SER-ACNC: 1.56 MIU/ML (ref 0.36–3.74)
VLDLC SERPL CALC-MCNC: 9 MG/DL (ref 0–30)
WBC # BLD AUTO: 6.8 X10(3) UL (ref 4–11)

## 2021-01-23 PROCEDURE — 80061 LIPID PANEL: CPT | Performed by: FAMILY MEDICINE

## 2021-01-23 PROCEDURE — 3061F NEG MICROALBUMINURIA REV: CPT | Performed by: FAMILY MEDICINE

## 2021-01-23 PROCEDURE — 84153 ASSAY OF PSA TOTAL: CPT | Performed by: FAMILY MEDICINE

## 2021-01-23 PROCEDURE — 83036 HEMOGLOBIN GLYCOSYLATED A1C: CPT | Performed by: FAMILY MEDICINE

## 2021-01-23 PROCEDURE — 82570 ASSAY OF URINE CREATININE: CPT | Performed by: FAMILY MEDICINE

## 2021-01-23 PROCEDURE — 99396 PREV VISIT EST AGE 40-64: CPT | Performed by: FAMILY MEDICINE

## 2021-01-23 PROCEDURE — 3008F BODY MASS INDEX DOCD: CPT | Performed by: FAMILY MEDICINE

## 2021-01-23 PROCEDURE — 71046 X-RAY EXAM CHEST 2 VIEWS: CPT | Performed by: FAMILY MEDICINE

## 2021-01-23 PROCEDURE — 3074F SYST BP LT 130 MM HG: CPT | Performed by: FAMILY MEDICINE

## 2021-01-23 PROCEDURE — 82043 UR ALBUMIN QUANTITATIVE: CPT | Performed by: FAMILY MEDICINE

## 2021-01-23 PROCEDURE — 80050 GENERAL HEALTH PANEL: CPT | Performed by: FAMILY MEDICINE

## 2021-01-23 PROCEDURE — 3078F DIAST BP <80 MM HG: CPT | Performed by: FAMILY MEDICINE

## 2021-01-23 RX ORDER — PEN NEEDLE, DIABETIC 31 GX5/16"
NEEDLE, DISPOSABLE MISCELLANEOUS
Qty: 100 EACH | Refills: 1 | Status: SHIPPED | OUTPATIENT
Start: 2021-01-23 | End: 2021-10-29

## 2021-01-23 RX ORDER — OLOPATADINE HYDROCHLORIDE 1 MG/ML
SOLUTION/ DROPS OPHTHALMIC
Qty: 3 BOTTLE | Refills: 1 | Status: SHIPPED | OUTPATIENT
Start: 2021-01-23 | End: 2021-10-30

## 2021-01-23 NOTE — PROGRESS NOTES
Camille aPng. is a 48year old male who presents for a complete physical exam.   HPI:   No concerns today. DM- doing good. No concerns   2020 fall Dr Jacinda Figueroa for eye check up.   Essential hypertension- bp stable  Fluticasone- for allergies     No colon canc Subcutaneous Solution Pen-injector INJECT SUBCUTANEOUSLY 12  UNITS NIGHTLY 15 mL 3   • Sildenafil Citrate 50 MG Oral Tab Take 1 tablet (50 mg total) by mouth daily as needed for Erectile Dysfunction.  8 tablet 0   • insulin glargine (LANTUS SOLOSTAR) 100 UN hematuria.   MUSCULOSKELETAL: denies back pain  NEURO: denies headaches or dizziness  PSYCHE: denies depression or anxiety      EXAM:   /78 (BP Location: Left arm, Patient Position: Sitting, Cuff Size: large)   Pulse 65   Temp 97.2 °F (36.2 °C) (Tempo

## 2021-01-25 ENCOUNTER — TELEPHONE (OUTPATIENT)
Dept: FAMILY MEDICINE CLINIC | Facility: CLINIC | Age: 51
End: 2021-01-25

## 2021-01-25 DIAGNOSIS — R91.1 NODULE OF RIGHT LUNG: Primary | ICD-10-CM

## 2021-01-25 NOTE — TELEPHONE ENCOUNTER
----- Message from Leandra Hagan MD sent at 1/25/2021  8:38 AM CST -----  Please call patient and inform his cxr shows some nodularity on right lung and recommend ct chest. I have placed order.

## 2021-01-25 NOTE — TELEPHONE ENCOUNTER
----- Message from Johnny Wilson MD sent at 1/25/2021  8:30 AM CST -----  Please call patient and inform his hba1c is stable at 6.2 and all other labs are normal. His lipid panel shows high ldl ( worsen since last time).  Continue statin and low fat diet

## 2021-02-06 ENCOUNTER — HOSPITAL ENCOUNTER (OUTPATIENT)
Dept: CT IMAGING | Age: 51
Discharge: HOME | End: 2021-02-06
Attending: FAMILY MEDICINE
Payer: COMMERCIAL

## 2021-02-06 DIAGNOSIS — R91.1 NODULE OF RIGHT LUNG: ICD-10-CM

## 2021-02-06 PROCEDURE — 71250 CT THORAX DX C-: CPT | Performed by: FAMILY MEDICINE

## 2021-03-16 ENCOUNTER — PATIENT MESSAGE (OUTPATIENT)
Dept: FAMILY MEDICINE CLINIC | Facility: CLINIC | Age: 51
End: 2021-03-16

## 2021-03-16 NOTE — TELEPHONE ENCOUNTER
From: Mavis Courser. To: Charisma Jay MD  Sent: 3/16/2021 2:38 PM CDT  Subject: Other    Hi, can I get a letter from doctor stating I'm eligible for COVID-19 vaccination under the 1b plus group because I have diabetes.  I can  letter at [facility]

## 2021-03-17 NOTE — TELEPHONE ENCOUNTER
Pt needs a letter from Dr Yaneth Carvajal stating he is Diabetic, he is going to [facility] Department to have his covid Vaccination, Pt states he needs the letter to give to the health department in order to be put him in the 1B+ group.

## 2021-03-27 RX ORDER — ATORVASTATIN CALCIUM 80 MG/1
TABLET, FILM COATED ORAL
Qty: 90 TABLET | Refills: 0 | Status: SHIPPED | OUTPATIENT
Start: 2021-03-27 | End: 2021-06-16

## 2021-03-27 RX ORDER — LISINOPRIL AND HYDROCHLOROTHIAZIDE 25; 20 MG/1; MG/1
1 TABLET ORAL DAILY
Qty: 90 TABLET | Refills: 0 | Status: SHIPPED | OUTPATIENT
Start: 2021-03-27 | End: 2021-06-16

## 2021-03-27 NOTE — TELEPHONE ENCOUNTER
Diabetic Medication Protocol Passed03/26/2021 09:08 PM   HgBA1C procedure resulted in past 6 months Protocol Details    Last HgBA1C < 7.5     Microalbumin procedure in past 12 months or taking ACE/ARB     Appointment in past 6 or next 3 months      Hyperte

## 2021-04-02 ENCOUNTER — TELEPHONE (OUTPATIENT)
Dept: FAMILY MEDICINE CLINIC | Facility: CLINIC | Age: 51
End: 2021-04-02

## 2021-05-14 RX ORDER — SILDENAFIL 50 MG/1
TABLET, FILM COATED ORAL
Qty: 8 TABLET | Refills: 0 | Status: SHIPPED | OUTPATIENT
Start: 2021-05-14 | End: 2021-07-12

## 2021-05-14 NOTE — TELEPHONE ENCOUNTER
LOV: 1/23/21 for annual    Sildenafil Citrate 50 MG Oral Tab 8 tablet 0 7/2/2020    Sig:   Take 1 tablet (50 mg total) by mouth daily as needed for Erectile Dysfunction. No future appointments.   Please advise

## 2021-05-21 ENCOUNTER — PATIENT MESSAGE (OUTPATIENT)
Dept: FAMILY MEDICINE CLINIC | Facility: CLINIC | Age: 51
End: 2021-05-21

## 2021-05-21 NOTE — TELEPHONE ENCOUNTER
From: Mavis Courser. To: Charisma Jay MD  Sent: 5/21/2021 11:11 AM CDT  Subject: Non-Urgent Medical Question    Hi, can you suggest any over the counter vitamin for prostate health.

## 2021-06-16 RX ORDER — ATORVASTATIN CALCIUM 80 MG/1
TABLET, FILM COATED ORAL
Qty: 90 TABLET | Refills: 0 | Status: SHIPPED | OUTPATIENT
Start: 2021-06-16 | End: 2021-09-09

## 2021-06-16 RX ORDER — LISINOPRIL AND HYDROCHLOROTHIAZIDE 25; 20 MG/1; MG/1
1 TABLET ORAL DAILY
Qty: 90 TABLET | Refills: 0 | Status: SHIPPED | OUTPATIENT
Start: 2021-06-16 | End: 2021-09-09

## 2021-06-16 NOTE — TELEPHONE ENCOUNTER
Cholesterol Medication Protocol Pnvduh90/15/2021 09:09 PM   ALT < 80 Protocol Details    ALT resulted within past year     Lipid panel within past 12 months     Appointment within past 12 or next 3 months      Last refilled on 03/27/21  for # 90  with 0  r

## 2021-06-16 NOTE — TELEPHONE ENCOUNTER
Hypertension Medications Protocol [id number]/16/2021 05:18 AM   CMP or BMP in past 12 months Protocol Details    Last serum creatinine< 2.0     Appointment in past 6 or next 3 months      Diabetic Medication Protocol [id number]/16/2021 05:18 AM   HgBA1C proced

## 2021-07-12 NOTE — TELEPHONE ENCOUNTER
Last refilled on 5/14/21 for # 8 with 0 refills  Last OV 1/23/21  No future appointments. Thank you.

## 2021-07-13 RX ORDER — SILDENAFIL 50 MG/1
50 TABLET, FILM COATED ORAL
Qty: 8 TABLET | Refills: 0 | Status: SHIPPED | OUTPATIENT
Start: 2021-07-13 | End: 2021-08-10

## 2021-07-22 ENCOUNTER — TELEPHONE (OUTPATIENT)
Dept: FAMILY MEDICINE CLINIC | Facility: CLINIC | Age: 51
End: 2021-07-22

## 2021-07-22 NOTE — TELEPHONE ENCOUNTER
----- Message from Brittney Allen RN sent at 1/25/2021  2:12 PM CST -----  Regarding: RHIANNON f/u with [facility]

## 2021-07-26 NOTE — TELEPHONE ENCOUNTER
Future Appointments   Date Time Provider Maximiliano Kajal   8/10/2021  8:40 AM Manish Bucio MD [facility] [place]

## 2021-08-10 ENCOUNTER — TELEPHONE (OUTPATIENT)
Dept: FAMILY MEDICINE CLINIC | Facility: CLINIC | Age: 51
End: 2021-08-10

## 2021-08-10 ENCOUNTER — OFFICE VISIT (OUTPATIENT)
Dept: FAMILY MEDICINE CLINIC | Facility: CLINIC | Age: 51
End: 2021-08-10
Payer: COMMERCIAL

## 2021-08-10 VITALS
OXYGEN SATURATION: 99 % | WEIGHT: 222 LBS | HEIGHT: 70 IN | BODY MASS INDEX: 31.78 KG/M2 | SYSTOLIC BLOOD PRESSURE: 130 MMHG | TEMPERATURE: 98 F | HEART RATE: 72 BPM | RESPIRATION RATE: 16 BRPM | DIASTOLIC BLOOD PRESSURE: 84 MMHG

## 2021-08-10 DIAGNOSIS — I10 ESSENTIAL HYPERTENSION: ICD-10-CM

## 2021-08-10 DIAGNOSIS — E78.2 MIXED HYPERLIPIDEMIA: ICD-10-CM

## 2021-08-10 DIAGNOSIS — J30.89 SEASONAL ALLERGIC RHINITIS DUE TO OTHER ALLERGIC TRIGGER: ICD-10-CM

## 2021-08-10 DIAGNOSIS — Z79.4 TYPE 2 DIABETES MELLITUS WITHOUT COMPLICATION, WITH LONG-TERM CURRENT USE OF INSULIN (HCC): ICD-10-CM

## 2021-08-10 DIAGNOSIS — E11.9 TYPE 2 DIABETES MELLITUS WITHOUT COMPLICATION, WITH LONG-TERM CURRENT USE OF INSULIN (HCC): ICD-10-CM

## 2021-08-10 DIAGNOSIS — Z79.899 MEDICATION MANAGEMENT: ICD-10-CM

## 2021-08-10 DIAGNOSIS — J30.89 SEASONAL ALLERGIC RHINITIS DUE TO OTHER ALLERGIC TRIGGER: Primary | ICD-10-CM

## 2021-08-10 DIAGNOSIS — N52.9 ERECTILE DYSFUNCTION, UNSPECIFIED ERECTILE DYSFUNCTION TYPE: ICD-10-CM

## 2021-08-10 LAB
ALBUMIN SERPL-MCNC: 3.5 G/DL (ref 3.4–5)
ALBUMIN/GLOB SERPL: 0.8 {RATIO} (ref 1–2)
ALP LIVER SERPL-CCNC: 88 U/L
ALT SERPL-CCNC: 36 U/L
ANION GAP SERPL CALC-SCNC: 3 MMOL/L (ref 0–18)
AST SERPL-CCNC: 20 U/L (ref 15–37)
BILIRUB SERPL-MCNC: 0.6 MG/DL (ref 0.1–2)
BUN BLD-MCNC: 20 MG/DL (ref 7–18)
CALCIUM BLD-MCNC: 9.1 MG/DL (ref 8.5–10.1)
CHLORIDE SERPL-SCNC: 108 MMOL/L (ref 98–112)
CHOLEST SMN-MCNC: 144 MG/DL (ref ?–200)
CK SERPL-CCNC: 284 U/L
CO2 SERPL-SCNC: 27 MMOL/L (ref 21–32)
CREAT BLD-MCNC: 1 MG/DL
EST. AVERAGE GLUCOSE BLD GHB EST-MCNC: 140 MG/DL (ref 68–126)
GLOBULIN PLAS-MCNC: 4.2 G/DL (ref 2.8–4.4)
GLUCOSE BLD-MCNC: 114 MG/DL (ref 70–99)
HBA1C MFR BLD HPLC: 6.5 % (ref ?–5.7)
HDLC SERPL-MCNC: 47 MG/DL (ref 40–59)
LDLC SERPL CALC-MCNC: 88 MG/DL (ref ?–100)
M PROTEIN MFR SERPL ELPH: 7.7 G/DL (ref 6.4–8.2)
NONHDLC SERPL-MCNC: 97 MG/DL (ref ?–130)
OSMOLALITY SERPL CALC.SUM OF ELEC: 289 MOSM/KG (ref 275–295)
PATIENT FASTING Y/N/NP: YES
PATIENT FASTING Y/N/NP: YES
POTASSIUM SERPL-SCNC: 3.5 MMOL/L (ref 3.5–5.1)
SODIUM SERPL-SCNC: 138 MMOL/L (ref 136–145)
TRIGL SERPL-MCNC: 39 MG/DL (ref 30–149)
VLDLC SERPL CALC-MCNC: 6 MG/DL (ref 0–30)

## 2021-08-10 PROCEDURE — 99214 OFFICE O/P EST MOD 30 MIN: CPT | Performed by: FAMILY MEDICINE

## 2021-08-10 PROCEDURE — 3079F DIAST BP 80-89 MM HG: CPT | Performed by: FAMILY MEDICINE

## 2021-08-10 PROCEDURE — 82550 ASSAY OF CK (CPK): CPT | Performed by: FAMILY MEDICINE

## 2021-08-10 PROCEDURE — 3008F BODY MASS INDEX DOCD: CPT | Performed by: FAMILY MEDICINE

## 2021-08-10 PROCEDURE — 3044F HG A1C LEVEL LT 7.0%: CPT | Performed by: FAMILY MEDICINE

## 2021-08-10 PROCEDURE — 83036 HEMOGLOBIN GLYCOSYLATED A1C: CPT | Performed by: FAMILY MEDICINE

## 2021-08-10 PROCEDURE — 3075F SYST BP GE 130 - 139MM HG: CPT | Performed by: FAMILY MEDICINE

## 2021-08-10 PROCEDURE — 80053 COMPREHEN METABOLIC PANEL: CPT | Performed by: FAMILY MEDICINE

## 2021-08-10 PROCEDURE — 80061 LIPID PANEL: CPT | Performed by: FAMILY MEDICINE

## 2021-08-10 RX ORDER — SILDENAFIL 50 MG/1
50 TABLET, FILM COATED ORAL
Qty: 15 TABLET | Refills: 0 | Status: SHIPPED | OUTPATIENT
Start: 2021-08-10 | End: 2021-08-10

## 2021-08-10 RX ORDER — SILDENAFIL 25 MG/1
25 TABLET, FILM COATED ORAL
Qty: 15 TABLET | Refills: 0 | Status: SHIPPED | OUTPATIENT
Start: 2021-08-10 | End: 2021-11-10

## 2021-08-10 RX ORDER — FEXOFENADINE HCL AND PSEUDOEPHEDRINE HCI 180; 240 MG/1; MG/1
1 TABLET, EXTENDED RELEASE ORAL DAILY
Qty: 90 TABLET | Refills: 0 | Status: SHIPPED
Start: 2021-08-10 | End: 2021-09-09

## 2021-08-10 NOTE — PROGRESS NOTES
Patient presents with: Follow - Up: DM       HPI:    Patient ID: Jas Carvalho. is a 48year old male. HPI   Patient in for diabetes eohhwn-jj-rd does check his blood glucose in the morning ranging from 90 to 100.   He saw the eye doctor Dr. Tierra Hancock in [place] pertinent surgical history.    Family History   Problem Relation Age of Onset   • Diabetes Father    • Heart Disorder Father       Social History: Social History    Tobacco Use      Smoking status: Former Smoker        Types: Cigars      Smokeless tobacco: PANEL  - CK CREATINE KINASE (NOT CREATININE)  - VENIPUNCTURE    5. Type 2 diabetes mellitus without complication, with long-term current use of insulin ([facility]  Continue medication we will repeat labs. - LIPID PANEL;  Future  - HEMOGLOBIN A1C; Future  - COMP

## 2021-08-10 NOTE — TELEPHONE ENCOUNTER
----- Message from Ean Peralta MD sent at 8/10/2021  1:47 PM CDT -----  Please inform hba1c 6.5 mild increase from 6.2. continue meds.  other labs normal. Repeat in 6 months

## 2021-08-11 RX ORDER — FEXOFENADINE HCL AND PSEUDOEPHEDRINE HCI 180; 240 MG/1; MG/1
1 TABLET, EXTENDED RELEASE ORAL DAILY
Qty: 90 TABLET | Refills: 0 | OUTPATIENT
Start: 2021-08-11

## 2021-08-13 ENCOUNTER — PATIENT MESSAGE (OUTPATIENT)
Dept: FAMILY MEDICINE CLINIC | Facility: CLINIC | Age: 51
End: 2021-08-13

## 2021-08-13 NOTE — TELEPHONE ENCOUNTER
From: Cody Hogue.   To: Caro Ureña MD  Sent: 8/13/2021 4:41 PM CDT  Subject: Prescription Question    The pharmacy still doesn't have the 90 day refill for Allegra

## 2021-08-16 ENCOUNTER — PATIENT MESSAGE (OUTPATIENT)
Dept: FAMILY MEDICINE CLINIC | Facility: CLINIC | Age: 51
End: 2021-08-16

## 2021-08-16 DIAGNOSIS — J30.89 SEASONAL ALLERGIC RHINITIS DUE TO OTHER ALLERGIC TRIGGER: ICD-10-CM

## 2021-08-16 NOTE — TELEPHONE ENCOUNTER
From: Peg Remy. To: Demetrio Bland MD  Sent: 8/16/2021 4:08 PM CDT  Subject: Prescription Question    I can come in and  the prescription?

## 2021-09-09 RX ORDER — LISINOPRIL AND HYDROCHLOROTHIAZIDE 25; 20 MG/1; MG/1
1 TABLET ORAL DAILY
Qty: 90 TABLET | Refills: 0 | Status: SHIPPED | OUTPATIENT
Start: 2021-09-09 | End: 2021-12-03

## 2021-09-09 RX ORDER — FEXOFENADINE HCL AND PSEUDOEPHEDRINE HCI 180; 240 MG/1; MG/1
1 TABLET, EXTENDED RELEASE ORAL DAILY
Qty: 90 TABLET | Refills: 0 | Status: CANCELLED | OUTPATIENT
Start: 2021-09-09

## 2021-09-09 RX ORDER — ATORVASTATIN CALCIUM 80 MG/1
TABLET, FILM COATED ORAL
Qty: 90 TABLET | Refills: 0 | Status: SHIPPED | OUTPATIENT
Start: 2021-09-09 | End: 2021-12-03

## 2021-09-09 RX ORDER — FEXOFENADINE HCL AND PSEUDOEPHEDRINE HCI 180; 240 MG/1; MG/1
1 TABLET, EXTENDED RELEASE ORAL DAILY
Qty: 90 TABLET | Refills: 0 | Status: SHIPPED
Start: 2021-09-09

## 2021-09-09 NOTE — TELEPHONE ENCOUNTER
Last refill 8/10/21 #90 0 refills  Called [facility] and was advised that rx for allegra D was never received.  Resent med

## 2021-09-09 NOTE — TELEPHONE ENCOUNTER
Diabetic Medication Protocol Kzybov3509/08/2021 10:14 PM   HgBA1C procedure resulted in past 6 months Protocol Details    Last HgBA1C < 7.5     Microalbumin procedure in past 12 months or taking ACE/ARB           Last OV 8/10/21  Last refill 6/16/21 #90 0 re

## 2021-10-06 DIAGNOSIS — N52.9 ERECTILE DYSFUNCTION, UNSPECIFIED ERECTILE DYSFUNCTION TYPE: ICD-10-CM

## 2021-10-06 RX ORDER — SILDENAFIL 50 MG/1
TABLET, FILM COATED ORAL
Qty: 15 TABLET | Refills: 0 | Status: SHIPPED | OUTPATIENT
Start: 2021-10-06 | End: 2021-10-29

## 2021-10-06 NOTE — TELEPHONE ENCOUNTER
LOV 8/10/21 for a f/u visit. Sildenafil Citrate 25 MG Oral Tab 15 tablet 0 8/10/2021    Sig:   Take 1 tablet (25 mg total) by mouth daily as needed for Erectile Dysfunction. Route:   Oral       No future appointments. Please advise.

## 2021-10-12 ENCOUNTER — NURSE ONLY (OUTPATIENT)
Dept: FAMILY MEDICINE CLINIC | Facility: CLINIC | Age: 51
End: 2021-10-12
Payer: COMMERCIAL

## 2021-10-12 DIAGNOSIS — Z23 NEED FOR VACCINATION: Primary | ICD-10-CM

## 2021-10-12 PROCEDURE — 90686 IIV4 VACC NO PRSV 0.5 ML IM: CPT | Performed by: FAMILY MEDICINE

## 2021-10-12 PROCEDURE — 90471 IMMUNIZATION ADMIN: CPT | Performed by: FAMILY MEDICINE

## 2021-10-28 DIAGNOSIS — H10.13 ALLERGIC CONJUNCTIVITIS OF BOTH EYES: ICD-10-CM

## 2021-10-28 DIAGNOSIS — E11.9 TYPE 2 DIABETES MELLITUS WITHOUT COMPLICATION, WITH LONG-TERM CURRENT USE OF INSULIN (HCC): ICD-10-CM

## 2021-10-28 DIAGNOSIS — Z79.4 TYPE 2 DIABETES MELLITUS WITHOUT COMPLICATION, WITH LONG-TERM CURRENT USE OF INSULIN (HCC): ICD-10-CM

## 2021-10-29 DIAGNOSIS — N52.9 ERECTILE DYSFUNCTION, UNSPECIFIED ERECTILE DYSFUNCTION TYPE: ICD-10-CM

## 2021-10-29 RX ORDER — PEN NEEDLE, DIABETIC 31 GX5/16"
NEEDLE, DISPOSABLE MISCELLANEOUS
Qty: 90 EACH | Refills: 0 | Status: SHIPPED | OUTPATIENT
Start: 2021-10-29 | End: 2022-01-25

## 2021-10-29 NOTE — TELEPHONE ENCOUNTER
Diabetic Supplies Protocol Passed 10/29/2021 03:39 PM    Appointment in the past 12 or next 3 months     rx sent   Per protocol passed.

## 2021-10-30 RX ORDER — OLOPATADINE HYDROCHLORIDE 1 MG/ML
SOLUTION/ DROPS OPHTHALMIC
Qty: 30 ML | Refills: 0 | Status: SHIPPED | OUTPATIENT
Start: 2021-10-30 | End: 2022-05-03

## 2021-10-30 RX ORDER — SILDENAFIL 50 MG/1
50 TABLET, FILM COATED ORAL
Qty: 15 TABLET | Refills: 0 | Status: SHIPPED | OUTPATIENT
Start: 2021-10-30

## 2021-11-15 ENCOUNTER — TELEPHONE (OUTPATIENT)
Dept: FAMILY MEDICINE CLINIC | Facility: CLINIC | Age: 51
End: 2021-11-15

## 2021-11-15 DIAGNOSIS — Z12.11 SCREENING FOR COLON CANCER: Primary | ICD-10-CM

## 2021-11-19 ENCOUNTER — LAB ENCOUNTER (OUTPATIENT)
Dept: LAB | Age: 51
End: 2021-11-19
Attending: FAMILY MEDICINE
Payer: COMMERCIAL

## 2021-11-19 DIAGNOSIS — Z12.11 ENCOUNTER FOR SCREENING COLONOSCOPY: ICD-10-CM

## 2021-11-19 PROCEDURE — 82274 ASSAY TEST FOR BLOOD FECAL: CPT | Performed by: FAMILY MEDICINE

## 2021-11-26 ENCOUNTER — TELEPHONE (OUTPATIENT)
Dept: FAMILY MEDICINE CLINIC | Facility: CLINIC | Age: 51
End: 2021-11-26

## 2021-11-26 DIAGNOSIS — R19.5 OCCULT BLOOD POSITIVE STOOL: Primary | ICD-10-CM

## 2021-11-26 NOTE — TELEPHONE ENCOUNTER
Pt was notify   Info given:  Referred to Provider Information:  Provider Address Phone   Homa De Leon MD [address] [place] [facility] [id number] [phone]     Pt verbalized understanding.

## 2021-11-26 NOTE — TELEPHONE ENCOUNTER
Please inform his blood stool testing is positive. I highly recommend he need ot see GI for further evaluation. Referral plced.

## 2021-11-29 RX ORDER — INSULIN GLARGINE 100 [IU]/ML
INJECTION, SOLUTION SUBCUTANEOUS
Qty: 15 ML | Refills: 3 | Status: SHIPPED | OUTPATIENT
Start: 2021-11-29

## 2021-11-29 NOTE — TELEPHONE ENCOUNTER
meds sent. Please get information about his last eye exam he was due in august 2021. If he had It done recently please try to get records.

## 2021-11-29 NOTE — TELEPHONE ENCOUNTER
LOV 8/10/21 for a DM f/u.    insulin glargine (LANTUS SOLOSTAR) 100 UNIT/ML Subcutaneous Solution Pen-injector 15 mL 3 11/2/2020    Sig:   INJECT SUBCUTANEOUSLY 12  UNITS NIGHTLY     Route:   (none)       Future Appointments   Date Time Provider Department

## 2021-12-01 ENCOUNTER — PATIENT MESSAGE (OUTPATIENT)
Dept: FAMILY MEDICINE CLINIC | Facility: CLINIC | Age: 51
End: 2021-12-01

## 2021-12-01 NOTE — TELEPHONE ENCOUNTER
From: Donnie Stahl. Sent: 12/1/2021 3:42 PM CST  To: Chrissy Petersen Clinical Staff  Subject: Cali Mejia in [place]. I believe the nurse looked it up on my last appointment.   Not sure if they still call it [address]

## 2021-12-03 RX ORDER — ATORVASTATIN CALCIUM 80 MG/1
TABLET, FILM COATED ORAL
Qty: 90 TABLET | Refills: 0 | Status: SHIPPED | OUTPATIENT
Start: 2021-12-03

## 2021-12-03 RX ORDER — LISINOPRIL AND HYDROCHLOROTHIAZIDE 25; 20 MG/1; MG/1
1 TABLET ORAL DAILY
Qty: 90 TABLET | Refills: 0 | Status: SHIPPED | OUTPATIENT
Start: 2021-12-03

## 2021-12-03 NOTE — TELEPHONE ENCOUNTER
LOV 8/10/21 for a DM f/u.     Hypertension Medications Protocol Passed 12/02/2021 10:16 PM   Protocol Details  CMP or BMP in past 12 months    Last serum creatinine< 2.0    Appointment in past 6 or next 3 months     Cholesterol Medication Protocol Passed 12

## 2022-01-17 ENCOUNTER — MED REC SCAN ONLY (OUTPATIENT)
Dept: FAMILY MEDICINE CLINIC | Facility: CLINIC | Age: 52
End: 2022-01-17

## 2022-01-18 ENCOUNTER — LAB ENCOUNTER (OUTPATIENT)
Dept: LAB | Age: 52
End: 2022-01-18
Attending: INTERNAL MEDICINE
Payer: COMMERCIAL

## 2022-01-18 DIAGNOSIS — Z01.818 PRE-OP TESTING: ICD-10-CM

## 2022-01-19 LAB — SARS-COV-2 RNA RESP QL NAA+PROBE: NOT DETECTED

## 2022-01-21 PROBLEM — Z12.11 SPECIAL SCREENING FOR MALIGNANT NEOPLASM OF COLON: Status: ACTIVE | Noted: 2022-01-21

## 2022-01-21 PROBLEM — D12.3 BENIGN NEOPLASM OF TRANSVERSE COLON: Status: ACTIVE | Noted: 2022-01-21

## 2022-01-24 DIAGNOSIS — E11.9 TYPE 2 DIABETES MELLITUS WITHOUT COMPLICATION, WITH LONG-TERM CURRENT USE OF INSULIN (HCC): ICD-10-CM

## 2022-01-24 DIAGNOSIS — Z79.4 TYPE 2 DIABETES MELLITUS WITHOUT COMPLICATION, WITH LONG-TERM CURRENT USE OF INSULIN (HCC): ICD-10-CM

## 2022-01-25 RX ORDER — PEN NEEDLE, DIABETIC 31 GX5/16"
NEEDLE, DISPOSABLE MISCELLANEOUS
Qty: 300 EACH | Refills: 2 | Status: SHIPPED | OUTPATIENT
Start: 2022-01-25 | End: 2022-01-28

## 2022-01-28 ENCOUNTER — TELEPHONE (OUTPATIENT)
Dept: FAMILY MEDICINE CLINIC | Facility: CLINIC | Age: 52
End: 2022-01-28

## 2022-01-28 DIAGNOSIS — Z79.4 TYPE 2 DIABETES MELLITUS WITHOUT COMPLICATION, WITH LONG-TERM CURRENT USE OF INSULIN (HCC): ICD-10-CM

## 2022-01-28 DIAGNOSIS — E11.9 TYPE 2 DIABETES MELLITUS WITHOUT COMPLICATION, WITH LONG-TERM CURRENT USE OF INSULIN (HCC): ICD-10-CM

## 2022-01-28 RX ORDER — PEN NEEDLE, DIABETIC 31 GX5/16"
NEEDLE, DISPOSABLE MISCELLANEOUS
Qty: 90 EACH | Refills: 3 | Status: SHIPPED | OUTPATIENT
Start: 2022-01-28

## 2022-01-28 NOTE — TELEPHONE ENCOUNTER
Received fax from [employer] asking for clarification on pen needle directions and quantity. Patient uses 1 pen needle daily.  New rx sent

## 2022-02-08 ENCOUNTER — TELEPHONE (OUTPATIENT)
Dept: FAMILY MEDICINE CLINIC | Facility: CLINIC | Age: 52
End: 2022-02-08

## 2022-02-08 NOTE — TELEPHONE ENCOUNTER
Due for f/u with Dr Enrike Spencer  No future appointments.    [facility] sent

## 2022-02-09 NOTE — TELEPHONE ENCOUNTER
Patient advised. Verbalized understanding.    Future Appointments   Date Time Provider Maximiliano Kajal   2/16/2022  9:00 AM Keanu Lugo MD [facility]

## 2022-02-16 ENCOUNTER — OFFICE VISIT (OUTPATIENT)
Dept: FAMILY MEDICINE CLINIC | Facility: CLINIC | Age: 52
End: 2022-02-16
Payer: COMMERCIAL

## 2022-02-16 VITALS
TEMPERATURE: 97 F | BODY MASS INDEX: 32.64 KG/M2 | HEART RATE: 78 BPM | DIASTOLIC BLOOD PRESSURE: 80 MMHG | SYSTOLIC BLOOD PRESSURE: 126 MMHG | RESPIRATION RATE: 16 BRPM | HEIGHT: 70 IN | WEIGHT: 228 LBS | OXYGEN SATURATION: 99 %

## 2022-02-16 DIAGNOSIS — Z12.5 PROSTATE CANCER SCREENING: ICD-10-CM

## 2022-02-16 DIAGNOSIS — E11.9 TYPE 2 DIABETES MELLITUS WITHOUT COMPLICATION, WITH LONG-TERM CURRENT USE OF INSULIN (HCC): Primary | ICD-10-CM

## 2022-02-16 DIAGNOSIS — Z79.4 TYPE 2 DIABETES MELLITUS WITHOUT COMPLICATION, WITH LONG-TERM CURRENT USE OF INSULIN (HCC): Primary | ICD-10-CM

## 2022-02-16 LAB
ANION GAP SERPL CALC-SCNC: 7 MMOL/L (ref 0–18)
BUN BLD-MCNC: 21 MG/DL (ref 7–18)
CALCIUM BLD-MCNC: 9.4 MG/DL (ref 8.5–10.1)
CHLORIDE SERPL-SCNC: 104 MMOL/L (ref 98–112)
CO2 SERPL-SCNC: 29 MMOL/L (ref 21–32)
COMPLEXED PSA SERPL-MCNC: 0.38 NG/ML (ref ?–4)
CREAT BLD-MCNC: 1 MG/DL
CREAT UR-SCNC: 98.5 MG/DL
EST. AVERAGE GLUCOSE BLD GHB EST-MCNC: 140 MG/DL (ref 68–126)
FASTING STATUS PATIENT QL REPORTED: YES
GLUCOSE BLD-MCNC: 171 MG/DL (ref 70–99)
HBA1C MFR BLD: 6.5 % (ref ?–5.7)
MICROALBUMIN UR-MCNC: 0.69 MG/DL
MICROALBUMIN/CREAT 24H UR-RTO: 7 UG/MG (ref ?–30)
OSMOLALITY SERPL CALC.SUM OF ELEC: 297 MOSM/KG (ref 275–295)
POTASSIUM SERPL-SCNC: 3.9 MMOL/L (ref 3.5–5.1)
SODIUM SERPL-SCNC: 140 MMOL/L (ref 136–145)

## 2022-02-16 PROCEDURE — 3008F BODY MASS INDEX DOCD: CPT | Performed by: FAMILY MEDICINE

## 2022-02-16 PROCEDURE — 80048 BASIC METABOLIC PNL TOTAL CA: CPT | Performed by: FAMILY MEDICINE

## 2022-02-16 PROCEDURE — 82570 ASSAY OF URINE CREATININE: CPT | Performed by: FAMILY MEDICINE

## 2022-02-16 PROCEDURE — 82043 UR ALBUMIN QUANTITATIVE: CPT | Performed by: FAMILY MEDICINE

## 2022-02-16 PROCEDURE — 3044F HG A1C LEVEL LT 7.0%: CPT | Performed by: FAMILY MEDICINE

## 2022-02-16 PROCEDURE — 83036 HEMOGLOBIN GLYCOSYLATED A1C: CPT | Performed by: FAMILY MEDICINE

## 2022-02-16 PROCEDURE — 99214 OFFICE O/P EST MOD 30 MIN: CPT | Performed by: FAMILY MEDICINE

## 2022-02-16 PROCEDURE — 3074F SYST BP LT 130 MM HG: CPT | Performed by: FAMILY MEDICINE

## 2022-02-16 PROCEDURE — 84153 ASSAY OF PSA TOTAL: CPT | Performed by: FAMILY MEDICINE

## 2022-02-16 PROCEDURE — 3079F DIAST BP 80-89 MM HG: CPT | Performed by: FAMILY MEDICINE

## 2022-02-16 PROCEDURE — 3061F NEG MICROALBUMINURIA REV: CPT | Performed by: FAMILY MEDICINE

## 2022-02-24 RX ORDER — LISINOPRIL AND HYDROCHLOROTHIAZIDE 25; 20 MG/1; MG/1
1 TABLET ORAL DAILY
Qty: 90 TABLET | Refills: 3 | Status: SHIPPED | OUTPATIENT
Start: 2022-02-24

## 2022-02-24 RX ORDER — ATORVASTATIN CALCIUM 80 MG/1
TABLET, FILM COATED ORAL
Qty: 90 TABLET | Refills: 3 | Status: SHIPPED | OUTPATIENT
Start: 2022-02-24

## 2022-03-01 ENCOUNTER — TELEPHONE (OUTPATIENT)
Dept: FAMILY MEDICINE CLINIC | Facility: CLINIC | Age: 52
End: 2022-03-01

## 2022-03-01 NOTE — TELEPHONE ENCOUNTER
----- Message from Nadia Arora RN sent at 4/2/2021  7:58 AM CDT -----  order ct lung in march 2022.  Please inform Dr. Akanksha Ugarte first before calling patient

## 2022-03-01 NOTE — TELEPHONE ENCOUNTER
Routing to PCP to advise. Notification to repeat CT chest in Feb 6, 2022. Last done March 2021. Please advise.

## 2022-05-02 ENCOUNTER — TELEPHONE (OUTPATIENT)
Dept: FAMILY MEDICINE CLINIC | Facility: CLINIC | Age: 52
End: 2022-05-02

## 2022-05-02 DIAGNOSIS — H10.13 ALLERGIC CONJUNCTIVITIS OF BOTH EYES: ICD-10-CM

## 2022-05-02 NOTE — TELEPHONE ENCOUNTER
Pt is traveling 06/03 would like to know if Dr. Maribel Keys place an order for a Covid test. No symptoms, just for travel

## 2022-05-03 RX ORDER — OLOPATADINE HYDROCHLORIDE 1 MG/ML
SOLUTION/ DROPS OPHTHALMIC
Qty: 30 ML | Refills: 0 | Status: SHIPPED | OUTPATIENT
Start: 2022-05-03 | End: 2023-02-20

## 2022-05-03 NOTE — TELEPHONE ENCOUNTER
Last refilled on 10/30/21 for # 30mL with 0 refills  Last OV 2/16/22  No future appointments. Thank you.

## 2022-06-03 ENCOUNTER — LAB ENCOUNTER (OUTPATIENT)
Dept: LAB | Age: 52
End: 2022-06-03
Attending: FAMILY MEDICINE
Payer: COMMERCIAL

## 2022-06-03 DIAGNOSIS — Z20.822 ENCOUNTER FOR LABORATORY TESTING FOR COVID-19 VIRUS: ICD-10-CM

## 2022-06-05 LAB — SARS-COV-2 RNA RESP QL NAA+PROBE: DETECTED

## 2022-06-07 ENCOUNTER — TELEPHONE (OUTPATIENT)
Dept: FAMILY MEDICINE CLINIC | Facility: CLINIC | Age: 52
End: 2022-06-07

## 2022-06-07 DIAGNOSIS — Z20.822 ENCOUNTER FOR LABORATORY TESTING FOR COVID-19 VIRUS: Primary | ICD-10-CM

## 2022-06-07 NOTE — TELEPHONE ENCOUNTER
Inform covid. He did symptoms started on Wednesday. Today day 7. He have mild congestion. No fever no sob. If sob fever chest pian go to er. He vu.

## 2022-06-18 ENCOUNTER — LAB ENCOUNTER (OUTPATIENT)
Dept: LAB | Age: 52
End: 2022-06-18
Attending: FAMILY MEDICINE
Payer: COMMERCIAL

## 2022-06-18 DIAGNOSIS — Z20.822 ENCOUNTER FOR LABORATORY TESTING FOR COVID-19 VIRUS: ICD-10-CM

## 2022-06-19 LAB — SARS-COV-2 RNA RESP QL NAA+PROBE: NOT DETECTED

## 2022-06-29 DIAGNOSIS — N52.9 ERECTILE DYSFUNCTION, UNSPECIFIED ERECTILE DYSFUNCTION TYPE: ICD-10-CM

## 2022-06-30 RX ORDER — SILDENAFIL 50 MG/1
TABLET, FILM COATED ORAL
Qty: 15 TABLET | Refills: 0 | Status: SHIPPED | OUTPATIENT
Start: 2022-06-30

## 2022-06-30 NOTE — TELEPHONE ENCOUNTER
Last refilled on 10/30/21 for # 15 with 0 refills  Last OV 2/16/22  No future appointments. Thank you.

## 2022-07-30 ENCOUNTER — TELEPHONE (OUTPATIENT)
Dept: FAMILY MEDICINE CLINIC | Facility: CLINIC | Age: 52
End: 2022-07-30

## 2022-09-27 RX ORDER — FLUTICASONE PROPIONATE 50 MCG
2 SPRAY, SUSPENSION (ML) NASAL DAILY
Qty: 1 EACH | Refills: 0 | Status: SHIPPED | OUTPATIENT
Start: 2022-09-27

## 2022-09-29 ENCOUNTER — OFFICE VISIT (OUTPATIENT)
Dept: FAMILY MEDICINE CLINIC | Facility: CLINIC | Age: 52
End: 2022-09-29

## 2022-09-29 VITALS
HEIGHT: 69.69 IN | SYSTOLIC BLOOD PRESSURE: 128 MMHG | RESPIRATION RATE: 16 BRPM | DIASTOLIC BLOOD PRESSURE: 82 MMHG | BODY MASS INDEX: 32 KG/M2 | OXYGEN SATURATION: 98 % | HEART RATE: 66 BPM | WEIGHT: 221 LBS | TEMPERATURE: 98 F

## 2022-09-29 DIAGNOSIS — Z79.4 TYPE 2 DIABETES MELLITUS WITHOUT COMPLICATION, WITH LONG-TERM CURRENT USE OF INSULIN (HCC): ICD-10-CM

## 2022-09-29 DIAGNOSIS — E11.9 TYPE 2 DIABETES MELLITUS WITHOUT COMPLICATION, WITH LONG-TERM CURRENT USE OF INSULIN (HCC): ICD-10-CM

## 2022-09-29 DIAGNOSIS — N52.9 ERECTILE DYSFUNCTION, UNSPECIFIED ERECTILE DYSFUNCTION TYPE: ICD-10-CM

## 2022-09-29 DIAGNOSIS — Z12.5 PROSTATE CANCER SCREENING: ICD-10-CM

## 2022-09-29 DIAGNOSIS — Z13.6 SCREENING FOR CARDIOVASCULAR CONDITION: ICD-10-CM

## 2022-09-29 DIAGNOSIS — Z23 NEED FOR VACCINATION: ICD-10-CM

## 2022-09-29 DIAGNOSIS — Z00.00 ANNUAL PHYSICAL EXAM: Primary | ICD-10-CM

## 2022-09-29 DIAGNOSIS — R91.1 PULMONARY NODULE, LEFT: ICD-10-CM

## 2022-09-29 LAB
ALBUMIN SERPL-MCNC: 3.7 G/DL (ref 3.4–5)
ALBUMIN/GLOB SERPL: 0.9 {RATIO} (ref 1–2)
ALP LIVER SERPL-CCNC: 105 U/L
ALT SERPL-CCNC: 35 U/L
ANION GAP SERPL CALC-SCNC: 4 MMOL/L (ref 0–18)
AST SERPL-CCNC: 17 U/L (ref 15–37)
BILIRUB SERPL-MCNC: 0.6 MG/DL (ref 0.1–2)
BUN BLD-MCNC: 19 MG/DL (ref 7–18)
CALCIUM BLD-MCNC: 9.3 MG/DL (ref 8.5–10.1)
CHLORIDE SERPL-SCNC: 108 MMOL/L (ref 98–112)
CHOLEST SERPL-MCNC: 124 MG/DL (ref ?–200)
CO2 SERPL-SCNC: 29 MMOL/L (ref 21–32)
COMPLEXED PSA SERPL-MCNC: 0.37 NG/ML (ref ?–4)
CREAT BLD-MCNC: 1.06 MG/DL
CREAT UR-SCNC: 127 MG/DL
ERYTHROCYTE [DISTWIDTH] IN BLOOD BY AUTOMATED COUNT: 12.7 %
EST. AVERAGE GLUCOSE BLD GHB EST-MCNC: 131 MG/DL (ref 68–126)
FASTING PATIENT LIPID ANSWER: YES
FASTING STATUS PATIENT QL REPORTED: YES
GFR SERPLBLD BASED ON 1.73 SQ M-ARVRAT: 85 ML/MIN/1.73M2 (ref 60–?)
GLOBULIN PLAS-MCNC: 4.2 G/DL (ref 2.8–4.4)
GLUCOSE BLD-MCNC: 107 MG/DL (ref 70–99)
HBA1C MFR BLD: 6.2 % (ref ?–5.7)
HCT VFR BLD AUTO: 48.7 %
HDLC SERPL-MCNC: 46 MG/DL (ref 40–59)
HGB BLD-MCNC: 16.1 G/DL
LDLC SERPL CALC-MCNC: 68 MG/DL (ref ?–100)
MCH RBC QN AUTO: 31.4 PG (ref 26–34)
MCHC RBC AUTO-ENTMCNC: 33.1 G/DL (ref 31–37)
MCV RBC AUTO: 95.1 FL
MICROALBUMIN UR-MCNC: 0.77 MG/DL
MICROALBUMIN/CREAT 24H UR-RTO: 6.1 UG/MG (ref ?–30)
NONHDLC SERPL-MCNC: 78 MG/DL (ref ?–130)
OSMOLALITY SERPL CALC.SUM OF ELEC: 295 MOSM/KG (ref 275–295)
PLATELET # BLD AUTO: 228 10(3)UL (ref 150–450)
POTASSIUM SERPL-SCNC: 3.8 MMOL/L (ref 3.5–5.1)
PROT SERPL-MCNC: 7.9 G/DL (ref 6.4–8.2)
RBC # BLD AUTO: 5.12 X10(6)UL
SODIUM SERPL-SCNC: 141 MMOL/L (ref 136–145)
TRIGL SERPL-MCNC: 40 MG/DL (ref 30–149)
TSI SER-ACNC: 1.27 MIU/ML (ref 0.36–3.74)
VLDLC SERPL CALC-MCNC: 6 MG/DL (ref 0–30)
WBC # BLD AUTO: 7 X10(3) UL (ref 4–11)

## 2022-09-29 PROCEDURE — 83036 HEMOGLOBIN GLYCOSYLATED A1C: CPT | Performed by: FAMILY MEDICINE

## 2022-09-29 PROCEDURE — 90686 IIV4 VACC NO PRSV 0.5 ML IM: CPT | Performed by: FAMILY MEDICINE

## 2022-09-29 PROCEDURE — 90471 IMMUNIZATION ADMIN: CPT | Performed by: FAMILY MEDICINE

## 2022-09-29 PROCEDURE — 80061 LIPID PANEL: CPT | Performed by: FAMILY MEDICINE

## 2022-09-29 PROCEDURE — 84153 ASSAY OF PSA TOTAL: CPT | Performed by: FAMILY MEDICINE

## 2022-09-29 PROCEDURE — 3008F BODY MASS INDEX DOCD: CPT | Performed by: FAMILY MEDICINE

## 2022-09-29 PROCEDURE — 82570 ASSAY OF URINE CREATININE: CPT | Performed by: FAMILY MEDICINE

## 2022-09-29 PROCEDURE — 3079F DIAST BP 80-89 MM HG: CPT | Performed by: FAMILY MEDICINE

## 2022-09-29 PROCEDURE — 82043 UR ALBUMIN QUANTITATIVE: CPT | Performed by: FAMILY MEDICINE

## 2022-09-29 PROCEDURE — 3074F SYST BP LT 130 MM HG: CPT | Performed by: FAMILY MEDICINE

## 2022-09-29 PROCEDURE — 99396 PREV VISIT EST AGE 40-64: CPT | Performed by: FAMILY MEDICINE

## 2022-09-29 PROCEDURE — 80050 GENERAL HEALTH PANEL: CPT | Performed by: FAMILY MEDICINE

## 2022-09-29 RX ORDER — SILDENAFIL 50 MG/1
50 TABLET, FILM COATED ORAL
Qty: 15 TABLET | Refills: 0 | Status: SHIPPED | OUTPATIENT
Start: 2022-09-29

## 2022-09-30 ENCOUNTER — TELEPHONE (OUTPATIENT)
Dept: FAMILY MEDICINE CLINIC | Facility: CLINIC | Age: 52
End: 2022-09-30

## 2022-09-30 NOTE — TELEPHONE ENCOUNTER
DESTINEY  Patient advised and verbalized understanding  Patient states he will monitor for hypoglycemic episodes  Patient states his sugars were a little low about 3 times this month  Patient states he will let us know if it happens again

## 2022-09-30 NOTE — TELEPHONE ENCOUNTER
----- Message from Milena Stone MD sent at 9/29/2022  6:19 PM CDT -----  Please inform labs shows normal blood count liver kidney functions cholesterol levels and thyroid levels. There is no protein in his urine. His hemoglobin A1c improved from 6.5-6.2 so now it is in the prediabetic range. Continue all medications. If you notice any hypoglycemic episodes then to call office and we will adjust his medication.

## 2022-10-18 RX ORDER — FLUTICASONE PROPIONATE 50 MCG
2 SPRAY, SUSPENSION (ML) NASAL DAILY
Qty: 1 EACH | Refills: 0 | Status: SHIPPED | OUTPATIENT
Start: 2022-10-18

## 2022-10-18 NOTE — TELEPHONE ENCOUNTER
Allergy Medication Protocol Passed 10/18/2022 01:19 PM    Appointment in the past 12 or next 3 months     Last seen  :09/29/22   rx sent   Per protocol passed.

## 2022-10-26 RX ORDER — FLUTICASONE PROPIONATE 50 MCG
SPRAY, SUSPENSION (ML) NASAL
Qty: 16 G | Refills: 0 | Status: SHIPPED | OUTPATIENT
Start: 2022-10-26

## 2022-12-13 ENCOUNTER — PATIENT MESSAGE (OUTPATIENT)
Dept: FAMILY MEDICINE CLINIC | Facility: CLINIC | Age: 52
End: 2022-12-13

## 2022-12-13 RX ORDER — FLUTICASONE PROPIONATE 50 MCG
SPRAY, SUSPENSION (ML) NASAL
Qty: 16 G | Refills: 0 | Status: SHIPPED | OUTPATIENT
Start: 2022-12-13

## 2022-12-13 NOTE — TELEPHONE ENCOUNTER
CPT [id number] Auth Required per pt's Health Plan. Approved for tracking only    [facility] sent back to patient    No future appointments.

## 2022-12-13 NOTE — TELEPHONE ENCOUNTER
From: Akhil Godoy. To: Ivis Escalera MD  Sent: 12/13/2022 9:51 AM CST  Subject: CT Scan    Hi, I called to schedule my CT scan. The person that answered the phone said this was still waiting for insurance company approval.  Do I need to call [employer]?

## 2022-12-14 NOTE — TELEPHONE ENCOUNTER
Delivery Read From Message Type Attachments Subject   12/13/2022 11:04 AM Donita Bell RN Patient Medical Advice Request  CT Scan

## 2022-12-17 ENCOUNTER — HOSPITAL ENCOUNTER (OUTPATIENT)
Dept: CT IMAGING | Age: 52
Discharge: HOME | End: 2022-12-17
Attending: FAMILY MEDICINE
Payer: COMMERCIAL

## 2022-12-17 DIAGNOSIS — R91.1 PULMONARY NODULE, LEFT: ICD-10-CM

## 2022-12-17 PROCEDURE — 71250 CT THORAX DX C-: CPT | Performed by: FAMILY MEDICINE

## 2022-12-20 ENCOUNTER — TELEPHONE (OUTPATIENT)
Dept: FAMILY MEDICINE CLINIC | Facility: CLINIC | Age: 52
End: 2022-12-20

## 2022-12-20 NOTE — TELEPHONE ENCOUNTER
----- Message from Wendy Engel MD sent at 12/20/2022  9:45 AM CST -----  Results reviewed. Please inform patient his ct chest result shows nodule but is is stable recommend repeat ct in 2 yr. Can you put reminder for ct chest in 2 yr please.

## 2023-01-21 DIAGNOSIS — E11.9 TYPE 2 DIABETES MELLITUS WITHOUT COMPLICATION, WITH LONG-TERM CURRENT USE OF INSULIN (HCC): ICD-10-CM

## 2023-01-21 DIAGNOSIS — Z79.4 TYPE 2 DIABETES MELLITUS WITHOUT COMPLICATION, WITH LONG-TERM CURRENT USE OF INSULIN (HCC): ICD-10-CM

## 2023-01-21 NOTE — TELEPHONE ENCOUNTER
Fax received from [facility] requesting refills for Fluticasone and BD pen needled. His previous mail order pharmacy was [facility]. [facility] sent to clarify with pt where these refills should go. LOV 9/29/22 for his physical.  No future appointments.

## 2023-01-23 RX ORDER — PEN NEEDLE, DIABETIC 31 GX5/16"
NEEDLE, DISPOSABLE MISCELLANEOUS
Qty: 90 EACH | Refills: 3 | Status: SHIPPED | OUTPATIENT
Start: 2023-01-23

## 2023-01-23 RX ORDER — FLUTICASONE PROPIONATE 50 MCG
2 SPRAY, SUSPENSION (ML) NASAL DAILY
Qty: 16 G | Refills: 0 | Status: SHIPPED | OUTPATIENT
Start: 2023-01-23

## 2023-01-26 ENCOUNTER — TELEPHONE (OUTPATIENT)
Dept: FAMILY MEDICINE CLINIC | Facility: CLINIC | Age: 53
End: 2023-01-26

## 2023-01-26 ENCOUNTER — PATIENT MESSAGE (OUTPATIENT)
Dept: FAMILY MEDICINE CLINIC | Facility: CLINIC | Age: 53
End: 2023-01-26

## 2023-01-26 NOTE — TELEPHONE ENCOUNTER
From: Sahara Rudolph. To: Sudha Torres MD  Sent: 1/26/2023 1:31 PM CST  Subject: [employer] refil    Can you respond to my refill request from [facility].  They keep calling me

## 2023-01-26 NOTE — TELEPHONE ENCOUNTER
Pt called back said he spoke with tin and was told his Rx has shipped.  So he would like us to disregard last msg

## 2023-01-30 ENCOUNTER — TELEPHONE (OUTPATIENT)
Dept: FAMILY MEDICINE CLINIC | Facility: CLINIC | Age: 53
End: 2023-01-30

## 2023-01-30 NOTE — TELEPHONE ENCOUNTER
Eye exam completed with Dr. Marquis Alan 8/14/21:    ASSESSMENT:  Insulin dependant Diabetes mellitus with no signs of diabetic retinopathy both sides, normal, no edema. stable no hemorrhage.         Updated flow sheet

## 2023-02-20 ENCOUNTER — PATIENT MESSAGE (OUTPATIENT)
Dept: FAMILY MEDICINE CLINIC | Facility: CLINIC | Age: 53
End: 2023-02-20

## 2023-02-20 DIAGNOSIS — H10.13 ALLERGIC CONJUNCTIVITIS OF BOTH EYES: ICD-10-CM

## 2023-02-20 DIAGNOSIS — E11.9 TYPE 2 DIABETES MELLITUS WITHOUT COMPLICATION, WITH LONG-TERM CURRENT USE OF INSULIN (HCC): Primary | ICD-10-CM

## 2023-02-20 DIAGNOSIS — Z79.4 TYPE 2 DIABETES MELLITUS WITHOUT COMPLICATION, WITH LONG-TERM CURRENT USE OF INSULIN (HCC): Primary | ICD-10-CM

## 2023-02-20 NOTE — TELEPHONE ENCOUNTER
Last refilled on 5/3/22 for # 30mL with 0 refills  Last OV 9/29/22  No future appointments. Thank you.

## 2023-02-20 NOTE — TELEPHONE ENCOUNTER
From: Napoleon Longo. To: Gabi Mcnulty MD  Sent: 2/20/2023 4:24 PM CST  Subject: Refill Lantus solostar insulin     Please send prescription refill to [facility] for mail order refills.   Rx TC:[id number]

## 2023-02-20 NOTE — TELEPHONE ENCOUNTER
From: Carroll Wood. To: Teddy Stoddard MD  Sent: 2/20/2023 4:46 PM CST  Subject: Refill     Please send one touch ultra prescription refill request to [facility] in [facility].

## 2023-02-21 RX ORDER — BLOOD SUGAR DIAGNOSTIC
1 STRIP MISCELLANEOUS 2 TIMES DAILY
Qty: 300 STRIP | Refills: 0 | Status: SHIPPED | OUTPATIENT
Start: 2023-02-21

## 2023-02-21 RX ORDER — INSULIN GLARGINE 100 [IU]/ML
12 INJECTION, SOLUTION SUBCUTANEOUS NIGHTLY
Qty: 15 ML | Refills: 0 | Status: CANCELLED | OUTPATIENT
Start: 2023-02-21

## 2023-02-21 RX ORDER — FLUTICASONE PROPIONATE 50 MCG
SPRAY, SUSPENSION (ML) NASAL
Qty: 16 G | Refills: 0 | Status: SHIPPED | OUTPATIENT
Start: 2023-02-21

## 2023-02-21 RX ORDER — OLOPATADINE HYDROCHLORIDE 1 MG/ML
SOLUTION/ DROPS OPHTHALMIC
Qty: 30 ML | Refills: 0 | Status: SHIPPED | OUTPATIENT
Start: 2023-02-21

## 2023-02-21 RX ORDER — INSULIN GLARGINE 100 [IU]/ML
12 INJECTION, SOLUTION SUBCUTANEOUS NIGHTLY
Qty: 15 ML | Refills: 1 | Status: SHIPPED | OUTPATIENT
Start: 2023-02-21

## 2023-02-21 NOTE — TELEPHONE ENCOUNTER
Allergy Medication Protocol Passed 02/20/2023 07:03 PM    Appointment in the past 12 or next 3 months

## 2023-02-21 NOTE — TELEPHONE ENCOUNTER
Rx refill request     insulin glargine (LANTUS SOLOSTAR) 100 UNIT/ML Subcutaneous Solution Pen-injector    [facility] [place] [facility] AT [address] TO [place] [address], [phone], [phone]

## 2023-03-09 RX ORDER — ATORVASTATIN CALCIUM 80 MG/1
80 TABLET, FILM COATED ORAL DAILY
Qty: 90 TABLET | Refills: 0 | Status: SHIPPED | OUTPATIENT
Start: 2023-03-09

## 2023-03-09 RX ORDER — LISINOPRIL AND HYDROCHLOROTHIAZIDE 25; 20 MG/1; MG/1
1 TABLET ORAL DAILY
Qty: 90 TABLET | Refills: 0 | Status: SHIPPED | OUTPATIENT
Start: 2023-03-09

## 2023-03-09 NOTE — TELEPHONE ENCOUNTER
Last refill 90 days 3 refills to local pharmacy on 2/24/22  Last OV 9/29/22  No future appointments.

## 2023-03-09 NOTE — TELEPHONE ENCOUNTER
Cholesterol Medication Protocol Passed   Last refill 2/24/22 90 3 refill  Diabetic Medication Protocol Passed   Hypertension Medications Protocol Passed

## 2023-03-23 ENCOUNTER — HOSPITAL ENCOUNTER (OUTPATIENT)
Age: 53
Discharge: HOME | End: 2023-03-23
Payer: COMMERCIAL

## 2023-03-23 VITALS
TEMPERATURE: 97 F | WEIGHT: 223 LBS | OXYGEN SATURATION: 100 % | HEIGHT: 69 IN | BODY MASS INDEX: 33.03 KG/M2 | DIASTOLIC BLOOD PRESSURE: 80 MMHG | RESPIRATION RATE: 18 BRPM | HEART RATE: 72 BPM | SYSTOLIC BLOOD PRESSURE: 121 MMHG

## 2023-03-23 DIAGNOSIS — H10.33 ACUTE CONJUNCTIVITIS OF BOTH EYES, UNSPECIFIED ACUTE CONJUNCTIVITIS TYPE: Primary | ICD-10-CM

## 2023-03-23 PROCEDURE — 99203 OFFICE O/P NEW LOW 30 MIN: CPT | Performed by: NURSE PRACTITIONER

## 2023-03-23 RX ORDER — OFLOXACIN 3 MG/ML
1 SOLUTION/ DROPS OPHTHALMIC EVERY 4 HOURS
Qty: 10 ML | Refills: 0 | Status: SHIPPED | OUTPATIENT
Start: 2023-03-23 | End: 2023-03-28

## 2023-03-23 NOTE — DISCHARGE INSTRUCTIONS
Continue using Flonase as prescribed. Also take over-the-counter antihistamine such as Zyrtec Allegra Claritin or Xyzal.  Use eyedrops as prescribed. Follow-up with ophthalmology in 1 week if symptoms do not improve or sooner if symptoms worsen.

## 2023-03-23 NOTE — ED INITIAL ASSESSMENT (HPI)
bilat eye irritation and redness. Hx of allergies, using pataday daily with no relief.  No discharge or crustiness

## 2023-04-11 RX ORDER — FLUTICASONE PROPIONATE 50 MCG
SPRAY, SUSPENSION (ML) NASAL
Qty: 16 G | Refills: 0 | Status: SHIPPED | OUTPATIENT
Start: 2023-04-11

## 2023-05-06 ENCOUNTER — TELEPHONE (OUTPATIENT)
Dept: FAMILY MEDICINE CLINIC | Facility: CLINIC | Age: 53
End: 2023-05-06

## 2023-05-18 RX ORDER — FLUTICASONE PROPIONATE 50 MCG
SPRAY, SUSPENSION (ML) NASAL
Qty: 16 G | Refills: 0 | Status: SHIPPED | OUTPATIENT
Start: 2023-05-18

## 2023-06-05 RX ORDER — FLUTICASONE PROPIONATE 50 MCG
SPRAY, SUSPENSION (ML) NASAL
Qty: 16 G | Refills: 0 | Status: SHIPPED | OUTPATIENT
Start: 2023-06-05

## 2023-06-08 ENCOUNTER — OFFICE VISIT (OUTPATIENT)
Dept: FAMILY MEDICINE CLINIC | Facility: CLINIC | Age: 53
End: 2023-06-08
Payer: COMMERCIAL

## 2023-06-08 ENCOUNTER — TELEPHONE (OUTPATIENT)
Dept: FAMILY MEDICINE CLINIC | Facility: CLINIC | Age: 53
End: 2023-06-08

## 2023-06-08 VITALS
BODY MASS INDEX: 33.77 KG/M2 | HEART RATE: 71 BPM | OXYGEN SATURATION: 98 % | SYSTOLIC BLOOD PRESSURE: 130 MMHG | WEIGHT: 228 LBS | TEMPERATURE: 97 F | DIASTOLIC BLOOD PRESSURE: 84 MMHG | HEIGHT: 69 IN | RESPIRATION RATE: 18 BRPM

## 2023-06-08 DIAGNOSIS — M76.62 ACHILLES TENDINITIS OF BOTH LOWER EXTREMITIES: Primary | ICD-10-CM

## 2023-06-08 DIAGNOSIS — Z79.4 TYPE 2 DIABETES MELLITUS WITHOUT COMPLICATION, WITH LONG-TERM CURRENT USE OF INSULIN (HCC): ICD-10-CM

## 2023-06-08 DIAGNOSIS — I10 ESSENTIAL HYPERTENSION, BENIGN: ICD-10-CM

## 2023-06-08 DIAGNOSIS — I10 ESSENTIAL HYPERTENSION: ICD-10-CM

## 2023-06-08 DIAGNOSIS — E78.2 MIXED HYPERLIPIDEMIA: ICD-10-CM

## 2023-06-08 DIAGNOSIS — E11.9 TYPE 2 DIABETES MELLITUS WITHOUT COMPLICATION, WITH LONG-TERM CURRENT USE OF INSULIN (HCC): ICD-10-CM

## 2023-06-08 DIAGNOSIS — E78.5 HYPERLIPIDEMIA, UNSPECIFIED HYPERLIPIDEMIA TYPE: ICD-10-CM

## 2023-06-08 DIAGNOSIS — M76.61 ACHILLES TENDINITIS OF BOTH LOWER EXTREMITIES: Primary | ICD-10-CM

## 2023-06-08 DIAGNOSIS — Z79.4 TYPE 2 DIABETES MELLITUS WITHOUT COMPLICATION, WITH LONG-TERM CURRENT USE OF INSULIN (HCC): Primary | ICD-10-CM

## 2023-06-08 DIAGNOSIS — E11.9 TYPE 2 DIABETES MELLITUS WITHOUT COMPLICATION, WITH LONG-TERM CURRENT USE OF INSULIN (HCC): Primary | ICD-10-CM

## 2023-06-08 LAB
ALBUMIN SERPL-MCNC: 3.5 G/DL (ref 3.4–5)
ALBUMIN/GLOB SERPL: 0.9 {RATIO} (ref 1–2)
ALP LIVER SERPL-CCNC: 99 U/L
ALT SERPL-CCNC: 35 U/L
ANION GAP SERPL CALC-SCNC: 2 MMOL/L (ref 0–18)
AST SERPL-CCNC: 19 U/L (ref 15–37)
BILIRUB SERPL-MCNC: 0.8 MG/DL (ref 0.1–2)
BUN BLD-MCNC: 22 MG/DL (ref 7–18)
CALCIUM BLD-MCNC: 9 MG/DL (ref 8.5–10.1)
CHLORIDE SERPL-SCNC: 108 MMOL/L (ref 98–112)
CHOLEST SERPL-MCNC: 135 MG/DL (ref ?–200)
CO2 SERPL-SCNC: 28 MMOL/L (ref 21–32)
CREAT BLD-MCNC: 1.13 MG/DL
EST. AVERAGE GLUCOSE BLD GHB EST-MCNC: 148 MG/DL (ref 68–126)
FASTING PATIENT LIPID ANSWER: YES
FASTING STATUS PATIENT QL REPORTED: YES
GFR SERPLBLD BASED ON 1.73 SQ M-ARVRAT: 78 ML/MIN/1.73M2 (ref 60–?)
GLOBULIN PLAS-MCNC: 4.1 G/DL (ref 2.8–4.4)
GLUCOSE BLD-MCNC: 123 MG/DL (ref 70–99)
HBA1C MFR BLD: 6.8 % (ref ?–5.7)
HDLC SERPL-MCNC: 52 MG/DL (ref 40–59)
LDLC SERPL CALC-MCNC: 74 MG/DL (ref ?–100)
NONHDLC SERPL-MCNC: 83 MG/DL (ref ?–130)
OSMOLALITY SERPL CALC.SUM OF ELEC: 291 MOSM/KG (ref 275–295)
POTASSIUM SERPL-SCNC: 3.7 MMOL/L (ref 3.5–5.1)
PROT SERPL-MCNC: 7.6 G/DL (ref 6.4–8.2)
SODIUM SERPL-SCNC: 138 MMOL/L (ref 136–145)
TRIGL SERPL-MCNC: 34 MG/DL (ref 30–149)
VLDLC SERPL CALC-MCNC: 5 MG/DL (ref 0–30)

## 2023-06-08 PROCEDURE — 3079F DIAST BP 80-89 MM HG: CPT | Performed by: NURSE PRACTITIONER

## 2023-06-08 PROCEDURE — 3008F BODY MASS INDEX DOCD: CPT | Performed by: NURSE PRACTITIONER

## 2023-06-08 PROCEDURE — 80061 LIPID PANEL: CPT | Performed by: NURSE PRACTITIONER

## 2023-06-08 PROCEDURE — 99214 OFFICE O/P EST MOD 30 MIN: CPT | Performed by: NURSE PRACTITIONER

## 2023-06-08 PROCEDURE — 3075F SYST BP GE 130 - 139MM HG: CPT | Performed by: NURSE PRACTITIONER

## 2023-06-08 PROCEDURE — 83036 HEMOGLOBIN GLYCOSYLATED A1C: CPT | Performed by: NURSE PRACTITIONER

## 2023-06-08 PROCEDURE — 80053 COMPREHEN METABOLIC PANEL: CPT | Performed by: NURSE PRACTITIONER

## 2023-06-08 RX ORDER — METHYLPREDNISOLONE 4 MG/1
TABLET ORAL
Qty: 1 EACH | Refills: 0 | Status: SHIPPED | OUTPATIENT
Start: 2023-06-08

## 2023-06-08 NOTE — TELEPHONE ENCOUNTER
----- Message from ANSHU Renee sent at 6/8/2023 12:47 PM CDT -----  Chemistries stable, not fasting  A1C mildly increased from previous. Would recommend increasing Metformin to 1000 mg BID.   Cholesterol stable    Recheck same labs in 3 mo

## 2023-06-18 DIAGNOSIS — N52.9 ERECTILE DYSFUNCTION, UNSPECIFIED ERECTILE DYSFUNCTION TYPE: ICD-10-CM

## 2023-06-19 RX ORDER — SILDENAFIL 100 MG/1
TABLET, FILM COATED ORAL
Qty: 24 TABLET | Refills: 0 | Status: SHIPPED | OUTPATIENT
Start: 2023-06-19

## 2023-06-29 RX ORDER — FLUTICASONE PROPIONATE 50 MCG
SPRAY, SUSPENSION (ML) NASAL
Qty: 16 G | Refills: 0 | Status: SHIPPED | OUTPATIENT
Start: 2023-06-29

## 2023-07-21 RX ORDER — FLUTICASONE PROPIONATE 50 MCG
SPRAY, SUSPENSION (ML) NASAL
Qty: 16 G | Refills: 0 | Status: SHIPPED | OUTPATIENT
Start: 2023-07-21

## 2023-07-21 NOTE — TELEPHONE ENCOUNTER
Last refilled 6/29/23  LOV with PJ 6/8/23  No future appt with pcp    Allergy Medication Protocol Wjggun2807/21/2023 01:06 AM    Appointment in the past 12 or next 3 months

## 2023-08-14 RX ORDER — FLUTICASONE PROPIONATE 50 MCG
SPRAY, SUSPENSION (ML) NASAL
Qty: 16 G | Refills: 0 | Status: SHIPPED | OUTPATIENT
Start: 2023-08-14

## 2023-08-14 NOTE — TELEPHONE ENCOUNTER
Allergy Medication Protocol Cvtmxd1608/13/2023 07:06 PM    Appointment in the past 12 or next 3 months

## 2023-09-05 ENCOUNTER — HOSPITAL ENCOUNTER (OUTPATIENT)
Age: 53
Discharge: HOME | End: 2023-09-05
Payer: COMMERCIAL

## 2023-09-05 ENCOUNTER — APPOINTMENT (OUTPATIENT)
Dept: GENERAL RADIOLOGY | Age: 53
End: 2023-09-05
Attending: PHYSICIAN ASSISTANT
Payer: COMMERCIAL

## 2023-09-05 VITALS
HEART RATE: 72 BPM | OXYGEN SATURATION: 98 % | TEMPERATURE: 97 F | DIASTOLIC BLOOD PRESSURE: 84 MMHG | RESPIRATION RATE: 18 BRPM | WEIGHT: 223 LBS | SYSTOLIC BLOOD PRESSURE: 128 MMHG | HEIGHT: 69 IN | BODY MASS INDEX: 33.03 KG/M2

## 2023-09-05 DIAGNOSIS — S83.412A SPRAIN OF MEDIAL COLLATERAL LIGAMENT OF LEFT KNEE, INITIAL ENCOUNTER: Primary | ICD-10-CM

## 2023-09-05 PROCEDURE — 99213 OFFICE O/P EST LOW 20 MIN: CPT | Performed by: PHYSICIAN ASSISTANT

## 2023-09-05 PROCEDURE — A6449 LT COMPRES BAND >=3" <5"/YD: HCPCS | Performed by: PHYSICIAN ASSISTANT

## 2023-09-05 PROCEDURE — 73560 X-RAY EXAM OF KNEE 1 OR 2: CPT | Performed by: PHYSICIAN ASSISTANT

## 2023-09-05 RX ORDER — NAPROXEN 500 MG/1
500 TABLET ORAL 2 TIMES DAILY PRN
Qty: 20 TABLET | Refills: 0 | Status: SHIPPED | OUTPATIENT
Start: 2023-09-05 | End: 2023-09-12

## 2023-09-05 NOTE — ED INITIAL ASSESSMENT (HPI)
Pt here w/ c/o pain to left knee by patella and medial aspect of knee. Pt states he did slip on wet pavement a few days ago but otherwise no known injury.

## 2023-09-05 NOTE — DISCHARGE INSTRUCTIONS
Ace wrap during the day. Remove at night. Elevate and ice. Topical Voltaren 4 times daily. Oral naproxen twice daily. Highly recommend orthopedic follow-up.

## 2023-09-06 ENCOUNTER — TELEPHONE (OUTPATIENT)
Dept: ORTHOPEDICS CLINIC | Facility: CLINIC | Age: 53
End: 2023-09-06

## 2023-09-06 DIAGNOSIS — Z01.89 ENCOUNTER FOR LOWER EXTREMITY COMPARISON IMAGING STUDY: ICD-10-CM

## 2023-09-06 DIAGNOSIS — M25.562 LEFT KNEE PAIN, UNSPECIFIED CHRONICITY: Primary | ICD-10-CM

## 2023-09-06 NOTE — TELEPHONE ENCOUNTER
Patient is scheduled with Dr. Ayan Whiet for a left knee injury. Please advise if imaging is needed.

## 2023-09-06 NOTE — TELEPHONE ENCOUNTER
Future Appointments   Date Time Provider Maximiliano Rdz   9/8/2023  9:00 AM Evert Engel,  [facility]     NWB 2 view xrays completed 09/5/23. Would you like repeat imaging? CONCLUSION:       No appreciable fracture or malalignment. Normal mineralization. Joint spaces are preserved. Small patellar enthesophytes. Small/mild suprapatellar joint effusion. No focal soft tissue swelling.

## 2023-09-07 RX ORDER — FLUTICASONE PROPIONATE 50 MCG
2 SPRAY, SUSPENSION (ML) NASAL DAILY
Qty: 16 G | Refills: 0 | Status: SHIPPED | OUTPATIENT
Start: 2023-09-07

## 2023-09-08 ENCOUNTER — TELEPHONE (OUTPATIENT)
Dept: FAMILY MEDICINE CLINIC | Facility: CLINIC | Age: 53
End: 2023-09-08

## 2023-09-08 ENCOUNTER — OFFICE VISIT (OUTPATIENT)
Dept: ORTHOPEDICS CLINIC | Facility: CLINIC | Age: 53
End: 2023-09-08
Payer: COMMERCIAL

## 2023-09-08 ENCOUNTER — HOSPITAL ENCOUNTER (OUTPATIENT)
Dept: GENERAL RADIOLOGY | Age: 53
Discharge: HOME | End: 2023-09-08
Attending: FAMILY MEDICINE
Payer: COMMERCIAL

## 2023-09-08 VITALS — HEIGHT: 69 IN | BODY MASS INDEX: 33.03 KG/M2 | WEIGHT: 223 LBS

## 2023-09-08 DIAGNOSIS — Z01.89 ENCOUNTER FOR LOWER EXTREMITY COMPARISON IMAGING STUDY: ICD-10-CM

## 2023-09-08 DIAGNOSIS — M25.562 LEFT KNEE PAIN, UNSPECIFIED CHRONICITY: ICD-10-CM

## 2023-09-08 DIAGNOSIS — S83.412A SPRAIN OF MEDIAL COLLATERAL LIGAMENT OF LEFT KNEE, INITIAL ENCOUNTER: Primary | ICD-10-CM

## 2023-09-08 DIAGNOSIS — M17.12 PRIMARY OSTEOARTHRITIS OF LEFT KNEE: ICD-10-CM

## 2023-09-08 PROCEDURE — 99204 OFFICE O/P NEW MOD 45 MIN: CPT | Performed by: FAMILY MEDICINE

## 2023-09-08 PROCEDURE — 73564 X-RAY EXAM KNEE 4 OR MORE: CPT | Performed by: FAMILY MEDICINE

## 2023-09-08 PROCEDURE — 3008F BODY MASS INDEX DOCD: CPT | Performed by: FAMILY MEDICINE

## 2023-09-08 NOTE — TELEPHONE ENCOUNTER
----- Message from Tildon Cheadle, RN sent at 6/8/2023  1:50 PM CDT -----  Repeat labs in 3 months - orders in

## 2023-09-13 ENCOUNTER — TELEPHONE (OUTPATIENT)
Dept: FAMILY MEDICINE CLINIC | Facility: CLINIC | Age: 53
End: 2023-09-13

## 2023-09-13 ENCOUNTER — LAB ENCOUNTER (OUTPATIENT)
Dept: LAB | Age: 53
End: 2023-09-13
Attending: NURSE PRACTITIONER
Payer: COMMERCIAL

## 2023-09-13 DIAGNOSIS — E11.9 TYPE 2 DIABETES MELLITUS WITHOUT COMPLICATION, WITH LONG-TERM CURRENT USE OF INSULIN (HCC): ICD-10-CM

## 2023-09-13 DIAGNOSIS — E78.5 HYPERLIPIDEMIA, UNSPECIFIED HYPERLIPIDEMIA TYPE: ICD-10-CM

## 2023-09-13 DIAGNOSIS — I10 ESSENTIAL HYPERTENSION, BENIGN: ICD-10-CM

## 2023-09-13 DIAGNOSIS — Z79.4 TYPE 2 DIABETES MELLITUS WITHOUT COMPLICATION, WITH LONG-TERM CURRENT USE OF INSULIN (HCC): ICD-10-CM

## 2023-09-13 LAB
ALBUMIN SERPL-MCNC: 3.2 G/DL (ref 3.4–5)
ALBUMIN/GLOB SERPL: 0.9 {RATIO} (ref 1–2)
ALP LIVER SERPL-CCNC: 93 U/L
ALT SERPL-CCNC: 36 U/L
ANION GAP SERPL CALC-SCNC: 4 MMOL/L (ref 0–18)
AST SERPL-CCNC: 15 U/L (ref 15–37)
BILIRUB SERPL-MCNC: 0.3 MG/DL (ref 0.1–2)
BUN BLD-MCNC: 19 MG/DL (ref 7–18)
CALCIUM BLD-MCNC: 8.5 MG/DL (ref 8.5–10.1)
CHLORIDE SERPL-SCNC: 108 MMOL/L (ref 98–112)
CHOLEST SERPL-MCNC: 133 MG/DL (ref ?–200)
CO2 SERPL-SCNC: 27 MMOL/L (ref 21–32)
CREAT BLD-MCNC: 1 MG/DL
EGFRCR SERPLBLD CKD-EPI 2021: 91 ML/MIN/1.73M2 (ref 60–?)
EST. AVERAGE GLUCOSE BLD GHB EST-MCNC: 143 MG/DL (ref 68–126)
FASTING PATIENT LIPID ANSWER: YES
FASTING STATUS PATIENT QL REPORTED: YES
GLOBULIN PLAS-MCNC: 3.7 G/DL (ref 2.8–4.4)
GLUCOSE BLD-MCNC: 112 MG/DL (ref 70–99)
HBA1C MFR BLD: 6.6 % (ref ?–5.7)
HDLC SERPL-MCNC: 44 MG/DL (ref 40–59)
LDLC SERPL CALC-MCNC: 79 MG/DL (ref ?–100)
NONHDLC SERPL-MCNC: 89 MG/DL (ref ?–130)
OSMOLALITY SERPL CALC.SUM OF ELEC: 291 MOSM/KG (ref 275–295)
POTASSIUM SERPL-SCNC: 3.7 MMOL/L (ref 3.5–5.1)
PROT SERPL-MCNC: 6.9 G/DL (ref 6.4–8.2)
SODIUM SERPL-SCNC: 139 MMOL/L (ref 136–145)
TRIGL SERPL-MCNC: 40 MG/DL (ref 30–149)
VLDLC SERPL CALC-MCNC: 6 MG/DL (ref 0–30)

## 2023-09-13 PROCEDURE — 83036 HEMOGLOBIN GLYCOSYLATED A1C: CPT | Performed by: NURSE PRACTITIONER

## 2023-09-13 PROCEDURE — 3044F HG A1C LEVEL LT 7.0%: CPT | Performed by: FAMILY MEDICINE

## 2023-09-13 PROCEDURE — 80061 LIPID PANEL: CPT | Performed by: NURSE PRACTITIONER

## 2023-09-13 PROCEDURE — 80053 COMPREHEN METABOLIC PANEL: CPT | Performed by: NURSE PRACTITIONER

## 2023-10-07 DIAGNOSIS — Z79.4 TYPE 2 DIABETES MELLITUS WITHOUT COMPLICATION, WITH LONG-TERM CURRENT USE OF INSULIN (HCC): ICD-10-CM

## 2023-10-07 DIAGNOSIS — E11.9 TYPE 2 DIABETES MELLITUS WITHOUT COMPLICATION, WITH LONG-TERM CURRENT USE OF INSULIN (HCC): ICD-10-CM

## 2023-10-07 RX ORDER — FLUTICASONE PROPIONATE 50 MCG
2 SPRAY, SUSPENSION (ML) NASAL DAILY
Qty: 16 G | Refills: 0 | Status: SHIPPED | OUTPATIENT
Start: 2023-10-07

## 2023-10-07 NOTE — TELEPHONE ENCOUNTER
LOV 23 with Olga for a leg issue. Overdue for px and labs. Please schedule. Can send bridging on his insulin if needed. fluticasone propionate 50 MCG/ACT Nasal Suspension 16 g 0 2023    Si sprays by Nasal route daily. Route:   Nasal         Allergy Medication Protocol Znxugp39/2023 01:12 AM    Appointment in the past 12 or next 3 months        Medication Quantity Refills Start End   insulin glargine (LANTUS SOLOSTAR) 100 UNIT/ML Subcutaneous Solution Pen-injector 15 mL 1 2023    Sig:   Inject 12 Units into the skin nightly. Route:   Subcutaneous       No future appointments.

## 2023-10-09 ENCOUNTER — ORDER TRANSCRIPTION (OUTPATIENT)
Dept: ADMINISTRATIVE | Facility: HOSPITAL | Age: 53
End: 2023-10-09

## 2023-10-09 DIAGNOSIS — Z13.6 SCREENING FOR CARDIOVASCULAR CONDITION: Primary | ICD-10-CM

## 2023-10-09 RX ORDER — INSULIN GLARGINE 100 [IU]/ML
12 INJECTION, SOLUTION SUBCUTANEOUS NIGHTLY
Qty: 15 ML | Refills: 1 | Status: SHIPPED | OUTPATIENT
Start: 2023-10-09

## 2023-10-09 NOTE — TELEPHONE ENCOUNTER
Future Appointments   Date Time Provider Maximiliano Kajal   12/7/2023  3:20 PM Chani Ramirez MD [facility]     Needs bridging sent to [facility]     Also, pt needs a New Rx for Metformin 1000 mg,   Labs were done on 9/13/23. Pt was told to take 500mg twice a day until it was time to refill.

## 2023-11-06 RX ORDER — FLUTICASONE PROPIONATE 50 MCG
2 SPRAY, SUSPENSION (ML) NASAL DAILY
Qty: 16 G | Refills: 0 | Status: SHIPPED | OUTPATIENT
Start: 2023-11-06

## 2023-11-06 NOTE — TELEPHONE ENCOUNTER
Allergy Medication Protocol Ylvaug2911/06/2023 07:07 AM    Appointment in the past 12 or next 3 months       To be filled at: [facility]

## 2023-11-09 ENCOUNTER — HOSPITAL ENCOUNTER (OUTPATIENT)
Age: 53
Discharge: HOME | End: 2023-11-09
Payer: COMMERCIAL

## 2023-11-30 DIAGNOSIS — H10.13 ALLERGIC CONJUNCTIVITIS OF BOTH EYES: ICD-10-CM

## 2023-12-01 ENCOUNTER — PATIENT MESSAGE (OUTPATIENT)
Dept: FAMILY MEDICINE CLINIC | Facility: CLINIC | Age: 53
End: 2023-12-01

## 2023-12-01 DIAGNOSIS — H10.13 ALLERGIC CONJUNCTIVITIS OF BOTH EYES: ICD-10-CM

## 2023-12-01 RX ORDER — OLOPATADINE HYDROCHLORIDE 1 MG/ML
SOLUTION/ DROPS OPHTHALMIC
Qty: 30 ML | Refills: 0 | Status: SHIPPED | OUTPATIENT
Start: 2023-12-01

## 2023-12-01 RX ORDER — OLOPATADINE HYDROCHLORIDE 1 MG/ML
SOLUTION/ DROPS OPHTHALMIC
Qty: 30 ML | Refills: 0 | Status: SHIPPED | OUTPATIENT
Start: 2023-12-01 | End: 2023-12-01

## 2023-12-01 NOTE — TELEPHONE ENCOUNTER
LOV: 6/8/23 for leg swelling    olopatadine 0.1 % Ophthalmic Solution  INSTILL 2 DROP TO BOTH EYES TWICE DAILY Dispense: 30 mL, Refills: 0 ordered       02/21/2023     Future Appointments   Date Time Provider Maximiliano Rdz   12/7/2023  3:20 PM Macy Ramirez MD [facility] [place]   12/8/2023  8:30 AM [address] [place] [address]

## 2023-12-01 NOTE — TELEPHONE ENCOUNTER
olopatadine 0.1 % Ophthalmic Solution   Pt needs this today, can covering provider send it?   Send to [employer]

## 2023-12-02 ENCOUNTER — PATIENT MESSAGE (OUTPATIENT)
Dept: FAMILY MEDICINE CLINIC | Facility: CLINIC | Age: 53
End: 2023-12-02

## 2023-12-02 DIAGNOSIS — N52.9 ERECTILE DYSFUNCTION, UNSPECIFIED ERECTILE DYSFUNCTION TYPE: ICD-10-CM

## 2023-12-04 ENCOUNTER — TELEPHONE (OUTPATIENT)
Dept: FAMILY MEDICINE CLINIC | Facility: CLINIC | Age: 53
End: 2023-12-04

## 2023-12-04 DIAGNOSIS — H10.13 ALLERGIC CONJUNCTIVITIS OF BOTH EYES: Primary | ICD-10-CM

## 2023-12-04 RX ORDER — SILDENAFIL 100 MG/1
100 TABLET, FILM COATED ORAL
Qty: 24 TABLET | Refills: 0 | Status: SHIPPED | OUTPATIENT
Start: 2023-12-04

## 2023-12-04 NOTE — TELEPHONE ENCOUNTER
Received fax from pharmacy advising that olopatadine solution on  back order and provide alternative  Please advise

## 2023-12-04 NOTE — TELEPHONE ENCOUNTER
From: Lucy Weinstein. To: Fifi Wray  Sent: 12/2/2023 2:25 PM CST  Subject: Sildenafil 100mg    Hi, can you fax a prescription refill request for 8 pills for 24 days to [facility] for mail order. Anything I need on a regular basis must be mail order.

## 2023-12-04 NOTE — TELEPHONE ENCOUNTER
LOV 6/8/23 with Olga for leg swelling. Future Appointments   Date Time Provider Maximiliano Rdz   12/7/2023  3:20 PM Radha Martínez MD [facility] [place]   12/8/2023  8:30 AM MARIO Bynum to send?

## 2023-12-06 RX ORDER — FLUTICASONE PROPIONATE 50 MCG
2 SPRAY, SUSPENSION (ML) NASAL DAILY
Qty: 16 G | Refills: 0 | Status: SHIPPED | OUTPATIENT
Start: 2023-12-06

## 2023-12-07 ENCOUNTER — OFFICE VISIT (OUTPATIENT)
Dept: FAMILY MEDICINE CLINIC | Facility: CLINIC | Age: 53
End: 2023-12-07
Payer: COMMERCIAL

## 2023-12-07 VITALS
OXYGEN SATURATION: 98 % | RESPIRATION RATE: 16 BRPM | TEMPERATURE: 98 F | SYSTOLIC BLOOD PRESSURE: 122 MMHG | DIASTOLIC BLOOD PRESSURE: 80 MMHG | HEIGHT: 68.5 IN | BODY MASS INDEX: 32.93 KG/M2 | WEIGHT: 219.81 LBS | HEART RATE: 68 BPM

## 2023-12-07 DIAGNOSIS — Z23 NEED FOR VACCINATION: ICD-10-CM

## 2023-12-07 DIAGNOSIS — Z12.5 PROSTATE CANCER SCREENING: ICD-10-CM

## 2023-12-07 DIAGNOSIS — Z79.4 TYPE 2 DIABETES MELLITUS WITHOUT COMPLICATION, WITH LONG-TERM CURRENT USE OF INSULIN (HCC): ICD-10-CM

## 2023-12-07 DIAGNOSIS — E78.49 OTHER HYPERLIPIDEMIA: ICD-10-CM

## 2023-12-07 DIAGNOSIS — Z00.00 ANNUAL PHYSICAL EXAM: Primary | ICD-10-CM

## 2023-12-07 DIAGNOSIS — E11.9 TYPE 2 DIABETES MELLITUS WITHOUT COMPLICATION, WITH LONG-TERM CURRENT USE OF INSULIN (HCC): ICD-10-CM

## 2023-12-07 DIAGNOSIS — I10 ESSENTIAL HYPERTENSION, BENIGN: ICD-10-CM

## 2023-12-07 PROCEDURE — 90677 PCV20 VACCINE IM: CPT | Performed by: FAMILY MEDICINE

## 2023-12-07 PROCEDURE — 3074F SYST BP LT 130 MM HG: CPT | Performed by: FAMILY MEDICINE

## 2023-12-07 PROCEDURE — 99396 PREV VISIT EST AGE 40-64: CPT | Performed by: FAMILY MEDICINE

## 2023-12-07 PROCEDURE — 3008F BODY MASS INDEX DOCD: CPT | Performed by: FAMILY MEDICINE

## 2023-12-07 PROCEDURE — 3079F DIAST BP 80-89 MM HG: CPT | Performed by: FAMILY MEDICINE

## 2023-12-07 PROCEDURE — 90471 IMMUNIZATION ADMIN: CPT | Performed by: FAMILY MEDICINE

## 2023-12-07 RX ORDER — OLOPATADINE HYDROCHLORIDE 2 MG/ML
1 SOLUTION/ DROPS OPHTHALMIC DAILY
Qty: 2.5 ML | Refills: 1 | Status: SHIPPED | OUTPATIENT
Start: 2023-12-07 | End: 2023-12-08

## 2023-12-08 ENCOUNTER — HOSPITAL ENCOUNTER (OUTPATIENT)
Dept: ULTRASOUND IMAGING | Age: 53
Discharge: HOME | End: 2023-12-08
Attending: FAMILY MEDICINE

## 2023-12-08 ENCOUNTER — LAB ENCOUNTER (OUTPATIENT)
Dept: LAB | Age: 53
End: 2023-12-08
Attending: FAMILY MEDICINE
Payer: COMMERCIAL

## 2023-12-08 ENCOUNTER — TELEPHONE (OUTPATIENT)
Dept: FAMILY MEDICINE CLINIC | Facility: CLINIC | Age: 53
End: 2023-12-08

## 2023-12-08 DIAGNOSIS — E11.9 TYPE 2 DIABETES MELLITUS WITHOUT COMPLICATION, WITH LONG-TERM CURRENT USE OF INSULIN (HCC): ICD-10-CM

## 2023-12-08 DIAGNOSIS — Z79.4 TYPE 2 DIABETES MELLITUS WITHOUT COMPLICATION, WITH LONG-TERM CURRENT USE OF INSULIN (HCC): ICD-10-CM

## 2023-12-08 DIAGNOSIS — H10.13 ALLERGIC CONJUNCTIVITIS OF BOTH EYES: ICD-10-CM

## 2023-12-08 DIAGNOSIS — Z12.5 PROSTATE CANCER SCREENING: ICD-10-CM

## 2023-12-08 DIAGNOSIS — Z00.00 ANNUAL PHYSICAL EXAM: ICD-10-CM

## 2023-12-08 DIAGNOSIS — Z13.6 SCREENING FOR CARDIOVASCULAR CONDITION: ICD-10-CM

## 2023-12-08 LAB
COMPLEXED PSA SERPL-MCNC: 0.35 NG/ML (ref ?–4)
CREAT UR-SCNC: 111 MG/DL
ERYTHROCYTE [DISTWIDTH] IN BLOOD BY AUTOMATED COUNT: 13.2 %
HCT VFR BLD AUTO: 46.9 %
HGB BLD-MCNC: 15.3 G/DL
MCH RBC QN AUTO: 30.2 PG (ref 26–34)
MCHC RBC AUTO-ENTMCNC: 32.6 G/DL (ref 31–37)
MCV RBC AUTO: 92.7 FL
MICROALBUMIN UR-MCNC: 1.17 MG/DL
MICROALBUMIN/CREAT 24H UR-RTO: 10.5 UG/MG (ref ?–30)
PLATELET # BLD AUTO: 211 10(3)UL (ref 150–450)
RBC # BLD AUTO: 5.06 X10(6)UL
TSI SER-ACNC: 1.21 MIU/ML (ref 0.36–3.74)
WBC # BLD AUTO: 10.3 X10(3) UL (ref 4–11)

## 2023-12-08 PROCEDURE — 82570 ASSAY OF URINE CREATININE: CPT

## 2023-12-08 PROCEDURE — 82043 UR ALBUMIN QUANTITATIVE: CPT

## 2023-12-08 PROCEDURE — 84443 ASSAY THYROID STIM HORMONE: CPT

## 2023-12-08 PROCEDURE — 85027 COMPLETE CBC AUTOMATED: CPT

## 2023-12-08 NOTE — TELEPHONE ENCOUNTER
Pt called said that Rx was sent to to the wrong pharmacy its supposed to go to     metFORMIN HCl 1000 MG Oral Tab       [facility] [employer], [phone], [phone]     Also this Rx Olopatadine HCl 0.2 % Ophthalmic Solution  per pt is the wrong dose and it also went to the wrong pharmacy

## 2023-12-08 NOTE — TELEPHONE ENCOUNTER
Received call from [facility] - eye drops out of stock  Patient notified - rx sent to [facility]  Patient will buy over the counter if rx not available.

## 2023-12-08 NOTE — TELEPHONE ENCOUNTER
Metformin resent to [facility]  Patient states the eye drops need to be 0.6% or higher in order to be covered by his insurance  Can this dose be changed?

## 2023-12-23 ENCOUNTER — TELEPHONE (OUTPATIENT)
Dept: FAMILY MEDICINE CLINIC | Facility: CLINIC | Age: 53
End: 2023-12-23

## 2024-01-05 DIAGNOSIS — Z79.4 TYPE 2 DIABETES MELLITUS WITHOUT COMPLICATION, WITH LONG-TERM CURRENT USE OF INSULIN (HCC): ICD-10-CM

## 2024-01-05 DIAGNOSIS — E11.9 TYPE 2 DIABETES MELLITUS WITHOUT COMPLICATION, WITH LONG-TERM CURRENT USE OF INSULIN (HCC): ICD-10-CM

## 2024-01-05 RX ORDER — PEN NEEDLE, DIABETIC 31 GX5/16"
NEEDLE, DISPOSABLE MISCELLANEOUS
Qty: 200 EACH | Refills: 0 | Status: SHIPPED | OUTPATIENT
Start: 2024-01-05

## 2024-01-05 RX ORDER — FLUTICASONE PROPIONATE 50 MCG
2 SPRAY, SUSPENSION (ML) NASAL DAILY
Qty: 16 G | Refills: 0 | Status: SHIPPED | OUTPATIENT
Start: 2024-01-05

## 2024-01-05 NOTE — TELEPHONE ENCOUNTER
Allergy Medication Protocol Ytqdvw1701/05/2024 07:13 AM    Appointment in the past 12 or next 3 months        Name from pharmacy: BD SHORT NDL PEN [id number]         Will file in chart as: BD PEN NEEDLE SHORT U/F 31G X 8 MM Does not apply Misc    Sig: USE AND DISCARD 1 PEN      NEEDLE DAILY.    Disp: Not specified (Pharmacy requested: 90 Undefined)    Refills: 3    Start: 1/5/2024    Class: Normal    Non-formulary For: Type 2 diabetes mellitus without complication, with long-term current use of insulin (HCC)    Last ordered: 11 months ago (1/23/2023) by Joslyn Castillo MD    Last refill: 10/13/2023    Rx #: [id number]    Diabetic Supplies Protocol Passed      Last OV 12/7/23    No future appointments.

## 2024-01-16 NOTE — TELEPHONE ENCOUNTER
LOV yesterday for DM. Fexofenadine-Pseudoephed ER (ALLEGRA-D ALLERGY & CONGESTION) 180-240 MG Oral Tablet 24 Hr 90 tablet 0 8/10/2021     Sig - Route:  Take 1 tablet by mouth daily. - Oral    Sent to pharmacy as: Fexofenadine-Pseudoephed -240 MG Ta
hide

## 2024-02-05 DIAGNOSIS — N52.9 ERECTILE DYSFUNCTION, UNSPECIFIED ERECTILE DYSFUNCTION TYPE: ICD-10-CM

## 2024-02-05 RX ORDER — FLUTICASONE PROPIONATE 50 MCG
2 SPRAY, SUSPENSION (ML) NASAL DAILY
Qty: 16 G | Refills: 0 | Status: SHIPPED | OUTPATIENT
Start: 2024-02-05

## 2024-02-05 RX ORDER — SILDENAFIL 100 MG/1
100 TABLET, FILM COATED ORAL DAILY PRN
Qty: 12 TABLET | Refills: 0 | Status: SHIPPED | OUTPATIENT
Start: 2024-02-05

## 2024-02-05 NOTE — TELEPHONE ENCOUNTER
LOV 12/7/23 for a px.    Medication Quantity Refills Start End   Sildenafil Citrate 100 MG Oral Tab 24 tablet 0 12/4/2023 --   Sig:   Take 1 tablet (100 mg total) by mouth daily as needed for Erectile Dysfunction.     Route:   Oral       No future appointments.

## 2024-02-20 RX ORDER — ATORVASTATIN CALCIUM 80 MG/1
80 TABLET, FILM COATED ORAL DAILY
Qty: 90 TABLET | Refills: 0 | Status: SHIPPED | OUTPATIENT
Start: 2024-02-20

## 2024-02-20 RX ORDER — LISINOPRIL AND HYDROCHLOROTHIAZIDE 25; 20 MG/1; MG/1
1 TABLET ORAL DAILY
Qty: 90 TABLET | Refills: 0 | Status: SHIPPED | OUTPATIENT
Start: 2024-02-20

## 2024-02-20 NOTE — TELEPHONE ENCOUNTER
Cholesterol Medication Protocol Sqbewz5902/20/2024 01:16 AM   Protocol Details ALT < 80    ALT resulted within past year    Lipid panel within past 12 months    In person appointment or virtual visit in the past 12 mos or appointment in next 3 mos        Name from pharmacy: LISINOP/HCTZ TAB 20-25MG         Will file in chart as: LISINOPRIL-HYDROCHLOROTHIAZIDE 20-25 MG Oral Tab    Sig: TAKE 1 TABLET DAILY    Disp: 90 tablet    Refills: 3    Start: 2/20/2024    Class: Normal    Non-formulary    Last ordered: 11 months ago (3/9/2023) by Joslyn Castillo MD    Last refill: 11/12/2023    Rx #: [id number]    Hypertension Medications Protocol Passed

## 2024-03-23 RX ORDER — FLUTICASONE PROPIONATE 50 MCG
2 SPRAY, SUSPENSION (ML) NASAL DAILY
Qty: 16 G | Refills: 0 | Status: SHIPPED | OUTPATIENT
Start: 2024-03-23

## 2024-03-23 NOTE — TELEPHONE ENCOUNTER
Allergy Medication Protocol Passed03/23/2024 07:06 AM   Protocol Details In person appointment or virtual visit in the past 12 mos or appointment in next 3 mos

## 2024-04-24 ENCOUNTER — HOSPITAL ENCOUNTER (OUTPATIENT)
Age: 54
Discharge: HOME | End: 2024-04-24
Payer: COMMERCIAL

## 2024-04-24 VITALS
WEIGHT: 223 LBS | HEIGHT: 69 IN | BODY MASS INDEX: 33.03 KG/M2 | OXYGEN SATURATION: 99 % | RESPIRATION RATE: 18 BRPM | DIASTOLIC BLOOD PRESSURE: 92 MMHG | HEART RATE: 74 BPM | TEMPERATURE: 98 F | SYSTOLIC BLOOD PRESSURE: 146 MMHG

## 2024-04-24 DIAGNOSIS — J01.10 ACUTE FRONTAL SINUSITIS, RECURRENCE NOT SPECIFIED: Primary | ICD-10-CM

## 2024-04-24 PROCEDURE — 99214 OFFICE O/P EST MOD 30 MIN: CPT | Performed by: NURSE PRACTITIONER

## 2024-04-24 RX ORDER — AMOXICILLIN AND CLAVULANATE POTASSIUM 875; 125 MG/1; MG/1
1 TABLET, FILM COATED ORAL 2 TIMES DAILY
Qty: 20 TABLET | Refills: 0 | Status: SHIPPED | OUTPATIENT
Start: 2024-04-24 | End: 2024-05-04

## 2024-04-24 NOTE — ED PROVIDER NOTES
Patient Seen in: [facility]      History     Chief Complaint   Patient presents with    Nasal Congestion     Stated Complaint: congestion    Subjective:   HPI    Pt is a 53yr old male here today with congestion and \" a sinus infection\" that started 4-5 days ago.  Pt denies fever, chills, nausea vomting.  He reports getting 2-3 sinus infections yearly.     Objective:   Past Medical History:    Diabetes (HCC)    Essential hypertension              History reviewed. No pertinent surgical history.             Social History     Socioeconomic History    Marital status:    Tobacco Use    Smoking status: Former     Types: Cigars     Passive exposure: Past    Smokeless tobacco: Never    Tobacco comments:     non-smoker   Vaping Use    Vaping status: Never Used   Substance and Sexual Activity    Alcohol use: Yes     Comment: OCC    Drug use: No              Review of Systems    Positive for stated complaint: congestion  Other systems are as noted in HPI.  Constitutional and vital signs reviewed.      All other systems reviewed and negative except as noted above.    Physical Exam     ED Triage Vitals [04/24/24 1709]   BP (!) 146/92   Pulse 74   Resp 18   Temp 97.7 °F (36.5 °C)   Temp src Temporal   SpO2 99 %   O2 Device None (Room air)       Current:BP (!) 146/92   Pulse 74   Temp 97.7 °F (36.5 °C) (Temporal)   Resp 18   Ht 175.3 cm (5' 9\")   Wt 101.2 kg   SpO2 99%   BMI 32.93 kg/m²         Physical Exam  Adult physical exam:     VS: Vital signs reviewed. O2 saturation within normal limits for this patient     General: Patient is awake and alert, oriented to person, place and time. Not in acute distress.      HEENT: Head is normocephalic atraumatic. Pupils reactive bilaterally.  EOMs intact.  No facial droop or slurred speech.  No oral pallor. Mucous membranes moist.  + frontal sinus pressure on palpation.      Lungs: good inspiratory effort. +air entry bilaterally without wheezes, rhonchi,  crackles.  No accessory muscle use or tachypnea.       Extremities: No edema.  Pulses 2+ extremities.   Brisk capillary refill noted.      Skin: Normal skin turgor     CNS: Moves all 4 extremities.  Interacts appropriately.  No tremor.  No gait abnormality           ED Course   Labs Reviewed - No data to display       I have personally  reviewed available prior medical records for any recent pertinent discharge summaries/testing. Patient/family updated on results and plan, a verbalized understanding and agreement with the plan.  I explained to the patient that emergent conditions may arise and to go to the ER for new, worsening or any persistent conditions. I've explained the importance of taking all medicatons as prescribed, follow up, and return precuations,  All questions answered.    Please note that this report has been produced using speech recognition software and may contain errors related to that system including, but not limited to, errors in grammar, punctuation, and spelling, as well as words and phrases that possibly may have been recognized inappropriately.  If there are any questions or concerns, contact the dictating provider for clarification.         MDM      Patient presents with sinus congestion and pressure  for the last 4-5 days. Nontoxic, does not meet SIRS criteria.   Patient does not have uvula deviation or unilateral tonsillar swelling to indicate tonsillar abscess. No meningsmus or trismus. No dysphagia or difficulty handing secretions. No evidence for otitis media. Patient does not have any respiratory distress.  O2 saturation within normal limits for this patient. Does not appear clinically dehydrated and is tolerating oral intake. Presentation consistent with sinusitis  . Encouraged patient on oral hydration and supportive care. Recommend follow up with PCP.  Return to ED precautions discussed with the patient and family.                                    Medical Decision  Making      Disposition and Plan     Clinical Impression:  1. Acute frontal sinusitis, recurrence not specified         Disposition:  Discharge  4/24/2024  5:29 pm    Follow-up:  Joslyn Castillo MD  [address] [id number] [phone]                Medications Prescribed:  Discharge Medication List as of 4/24/2024  5:38 PM        START taking these medications    Details   amoxicillin clavulanate 875-125 MG Oral Tab Take 1 tablet by mouth 2 (two) times daily for 10 days., Normal, Disp-20 tablet, R-0

## 2024-04-29 RX ORDER — FLUTICASONE PROPIONATE 50 MCG
2 SPRAY, SUSPENSION (ML) NASAL DAILY
Qty: 16 G | Refills: 0 | Status: SHIPPED | OUTPATIENT
Start: 2024-04-29

## 2024-05-08 DIAGNOSIS — Z79.4 TYPE 2 DIABETES MELLITUS WITHOUT COMPLICATION, WITH LONG-TERM CURRENT USE OF INSULIN (HCC): Primary | ICD-10-CM

## 2024-05-08 DIAGNOSIS — Z79.4 TYPE 2 DIABETES MELLITUS WITHOUT COMPLICATION, WITH LONG-TERM CURRENT USE OF INSULIN (HCC): ICD-10-CM

## 2024-05-08 DIAGNOSIS — E11.9 TYPE 2 DIABETES MELLITUS WITHOUT COMPLICATION, WITH LONG-TERM CURRENT USE OF INSULIN (HCC): ICD-10-CM

## 2024-05-08 DIAGNOSIS — N52.9 ERECTILE DYSFUNCTION, UNSPECIFIED ERECTILE DYSFUNCTION TYPE: ICD-10-CM

## 2024-05-08 DIAGNOSIS — E11.9 TYPE 2 DIABETES MELLITUS WITHOUT COMPLICATION, WITH LONG-TERM CURRENT USE OF INSULIN (HCC): Primary | ICD-10-CM

## 2024-05-09 RX ORDER — SILDENAFIL 100 MG/1
100 TABLET, FILM COATED ORAL DAILY PRN
Qty: 12 TABLET | Refills: 0 | Status: SHIPPED | OUTPATIENT
Start: 2024-05-09 | End: 2024-06-17

## 2024-05-09 RX ORDER — INSULIN GLARGINE 100 [IU]/ML
12 INJECTION, SOLUTION SUBCUTANEOUS NIGHTLY
Qty: 15 ML | Refills: 0 | Status: SHIPPED | OUTPATIENT
Start: 2024-05-09

## 2024-05-09 NOTE — TELEPHONE ENCOUNTER
Last OV 12/7/23  Last refilled on 2/5/24 for # 12 with 0 refills  No future appointments.     Thank you.

## 2024-05-09 NOTE — TELEPHONE ENCOUNTER
Diabetes Medication Protocol Uaqnxt2305/08/2024 10:25 PM   Protocol Details Last A1C < 7.5 and within past 6 months    In person appointment or virtual visit in the past 6 mos or appointment in next 3 mos    Microalbumin procedure in past 12 months or taking ACE/ARB    EGFRCR or GFRAA > 50    GFR in the past 12 months      Last OV 12/7/23  Last refilled on 10/9/23 for # 15mL with 1 refills  No future appointments.     Thank you.

## 2024-05-20 RX ORDER — LISINOPRIL AND HYDROCHLOROTHIAZIDE 25; 20 MG/1; MG/1
1 TABLET ORAL DAILY
Qty: 30 TABLET | Refills: 0 | Status: SHIPPED | OUTPATIENT
Start: 2024-05-20

## 2024-05-20 RX ORDER — ATORVASTATIN CALCIUM 80 MG/1
80 TABLET, FILM COATED ORAL DAILY
Qty: 90 TABLET | Refills: 0 | Status: SHIPPED | OUTPATIENT
Start: 2024-05-20

## 2024-05-20 NOTE — TELEPHONE ENCOUNTER
Rx #: [id number]     Cholesterol Medication Protocol Kyewwf8305/20/2024 01:18 AM   Protocol Details ALT < 80    ALT resulted within past year    Lipid panel within past 12 months    In person appointment or virtual visit in the past 12 mos or appointment in next 3 mos       Name from pharmacy: LISINOP/HCTZ TAB 20-25MG         Will file in chart as: LISINOPRIL-HYDROCHLOROTHIAZIDE 20-25 MG Oral Tab    Sig: TAKE 1 TABLET DAILY    Disp: 90 tablet    Refills: 0    Start: 5/20/2024    Class: Normal    Non-formulary    Last ordered: 3 months ago (2/20/2024) by Joslyn Castillo MD    Last refill: 2/26/2024    Rx #: [id number]    Hypertension Medications Protocol Saiiro7105/20/2024 01:18 AM   Protocol Details Last BP reading less than 140/90    CMP or BMP in past 12 months    In person appointment or virtual visit in the past 12 mos or appointment in next 3 mos    EGFRCR or GFRAA > 50        Last OV 12/7/23  Last refilled on 2/20/24 for # 90 with 0 refills  No future appointments.     Thank you.     home

## 2024-05-29 RX ORDER — FLUTICASONE PROPIONATE 50 MCG
2 SPRAY, SUSPENSION (ML) NASAL DAILY
Qty: 16 G | Refills: 0 | Status: SHIPPED | OUTPATIENT
Start: 2024-05-29

## 2024-05-29 NOTE — TELEPHONE ENCOUNTER
Last Office Visit: 12/7/23  Last Labs: 12/11/23    Overdue for repeat labs. Order on file. Please remind him

## 2024-06-05 NOTE — TELEPHONE ENCOUNTER
Patient advised. Verbalized understanding.   Future Appointments   Date Time Provider Department Center   6/20/2024  7:40 AM Talya Snyder APRN [facility] [place]

## 2024-06-11 ENCOUNTER — TELEPHONE (OUTPATIENT)
Dept: FAMILY MEDICINE CLINIC | Facility: CLINIC | Age: 54
End: 2024-06-11

## 2024-06-11 NOTE — TELEPHONE ENCOUNTER
Fasting labs due  [employer] message sent  Future Appointments   Date Time Provider Department Center   6/20/2024  7:40 AM Talya Snyder APRN [facility]

## 2024-06-12 NOTE — TELEPHONE ENCOUNTER
Lisinopril last refilled 5/20/24  Future appointment with Ammy 6/20/24  LOV with  12/7/23  Routed to advise refill      Hypertension Medications Protocol [id number]/12/2024 01:09 AM   Protocol Details Last BP reading less than 140/90    CMP or BMP in past 12 months    In person appointment or virtual visit in the past 12 mos or appointment in next 3 mos    EGFRCR or GFRAA > 50

## 2024-06-13 RX ORDER — LISINOPRIL AND HYDROCHLOROTHIAZIDE 25; 20 MG/1; MG/1
1 TABLET ORAL DAILY
Qty: 30 TABLET | Refills: 0 | Status: SHIPPED | OUTPATIENT
Start: 2024-06-13

## 2024-06-14 DIAGNOSIS — N52.9 ERECTILE DYSFUNCTION, UNSPECIFIED ERECTILE DYSFUNCTION TYPE: ICD-10-CM

## 2024-06-17 RX ORDER — SILDENAFIL 100 MG/1
100 TABLET, FILM COATED ORAL DAILY PRN
Qty: 12 TABLET | Refills: 0 | Status: SHIPPED | OUTPATIENT
Start: 2024-06-17

## 2024-06-17 NOTE — TELEPHONE ENCOUNTER
Last office visit 12/7/23  Last refilled on 5/9/24 for # 12 with 0 refills  Future Appointments   Date Time Provider Department Center   6/20/2024  7:40 AM Talya Snyder APRN [facility]        Thank you.

## 2024-06-21 DIAGNOSIS — E11.9 TYPE 2 DIABETES MELLITUS WITHOUT COMPLICATION, WITH LONG-TERM CURRENT USE OF INSULIN (HCC): ICD-10-CM

## 2024-06-21 DIAGNOSIS — Z79.4 TYPE 2 DIABETES MELLITUS WITHOUT COMPLICATION, WITH LONG-TERM CURRENT USE OF INSULIN (HCC): ICD-10-CM

## 2024-06-21 RX ORDER — PEN NEEDLE, DIABETIC 31 GX5/16"
NEEDLE, DISPOSABLE MISCELLANEOUS
Qty: 200 EACH | Refills: 1 | Status: SHIPPED | OUTPATIENT
Start: 2024-06-21

## 2024-06-21 RX ORDER — FLUTICASONE PROPIONATE 50 MCG
2 SPRAY, SUSPENSION (ML) NASAL DAILY
Qty: 16 G | Refills: 0 | Status: SHIPPED | OUTPATIENT
Start: 2024-06-21

## 2024-06-26 ENCOUNTER — PATIENT MESSAGE (OUTPATIENT)
Dept: FAMILY MEDICINE CLINIC | Facility: CLINIC | Age: 54
End: 2024-06-26

## 2024-06-26 ENCOUNTER — OFFICE VISIT (OUTPATIENT)
Dept: FAMILY MEDICINE CLINIC | Facility: CLINIC | Age: 54
End: 2024-06-26

## 2024-06-26 VITALS
HEART RATE: 73 BPM | TEMPERATURE: 98 F | OXYGEN SATURATION: 98 % | SYSTOLIC BLOOD PRESSURE: 126 MMHG | WEIGHT: 219 LBS | DIASTOLIC BLOOD PRESSURE: 80 MMHG | BODY MASS INDEX: 32 KG/M2

## 2024-06-26 DIAGNOSIS — H00.024 HORDEOLUM INTERNUM OF LEFT UPPER EYELID: ICD-10-CM

## 2024-06-26 DIAGNOSIS — N52.9 ERECTILE DYSFUNCTION, UNSPECIFIED ERECTILE DYSFUNCTION TYPE: ICD-10-CM

## 2024-06-26 DIAGNOSIS — E78.49 OTHER HYPERLIPIDEMIA: ICD-10-CM

## 2024-06-26 DIAGNOSIS — E11.9 TYPE 2 DIABETES MELLITUS WITHOUT COMPLICATION, WITH LONG-TERM CURRENT USE OF INSULIN (HCC): Primary | ICD-10-CM

## 2024-06-26 DIAGNOSIS — I10 ESSENTIAL HYPERTENSION, BENIGN: ICD-10-CM

## 2024-06-26 DIAGNOSIS — Z79.4 TYPE 2 DIABETES MELLITUS WITHOUT COMPLICATION, WITH LONG-TERM CURRENT USE OF INSULIN (HCC): Primary | ICD-10-CM

## 2024-06-26 LAB
ALBUMIN SERPL-MCNC: 3.7 G/DL (ref 3.4–5)
ALBUMIN/GLOB SERPL: 0.9 {RATIO} (ref 1–2)
ALP LIVER SERPL-CCNC: 95 U/L
ALT SERPL-CCNC: 31 U/L
ANION GAP SERPL CALC-SCNC: 8 MMOL/L (ref 0–18)
AST SERPL-CCNC: 25 U/L (ref 15–37)
BILIRUB SERPL-MCNC: 0.5 MG/DL (ref 0.1–2)
BUN BLD-MCNC: 20 MG/DL (ref 9–23)
CALCIUM BLD-MCNC: 9.1 MG/DL (ref 8.5–10.1)
CHLORIDE SERPL-SCNC: 105 MMOL/L (ref 98–112)
CHOLEST SERPL-MCNC: 149 MG/DL (ref ?–200)
CO2 SERPL-SCNC: 26 MMOL/L (ref 21–32)
CREAT BLD-MCNC: 0.91 MG/DL
EGFRCR SERPLBLD CKD-EPI 2021: 101 ML/MIN/1.73M2 (ref 60–?)
EST. AVERAGE GLUCOSE BLD GHB EST-MCNC: 143 MG/DL (ref 68–126)
FASTING PATIENT LIPID ANSWER: NO
FASTING STATUS PATIENT QL REPORTED: NO
GLOBULIN PLAS-MCNC: 4.3 G/DL (ref 2.8–4.4)
GLUCOSE BLD-MCNC: 110 MG/DL (ref 70–99)
HBA1C MFR BLD: 6.6 % (ref ?–5.7)
HDLC SERPL-MCNC: 52 MG/DL (ref 40–59)
LDLC SERPL CALC-MCNC: 89 MG/DL (ref ?–100)
NONHDLC SERPL-MCNC: 97 MG/DL (ref ?–130)
OSMOLALITY SERPL CALC.SUM OF ELEC: 291 MOSM/KG (ref 275–295)
POTASSIUM SERPL-SCNC: 3.3 MMOL/L (ref 3.5–5.1)
PROT SERPL-MCNC: 8 G/DL (ref 6.4–8.2)
SODIUM SERPL-SCNC: 139 MMOL/L (ref 136–145)
TRIGL SERPL-MCNC: 33 MG/DL (ref 30–149)
VLDLC SERPL CALC-MCNC: 5 MG/DL (ref 0–30)

## 2024-06-26 PROCEDURE — 99214 OFFICE O/P EST MOD 30 MIN: CPT | Performed by: NURSE PRACTITIONER

## 2024-06-26 PROCEDURE — 80061 LIPID PANEL: CPT | Performed by: NURSE PRACTITIONER

## 2024-06-26 PROCEDURE — 83036 HEMOGLOBIN GLYCOSYLATED A1C: CPT | Performed by: NURSE PRACTITIONER

## 2024-06-26 PROCEDURE — 3079F DIAST BP 80-89 MM HG: CPT | Performed by: NURSE PRACTITIONER

## 2024-06-26 PROCEDURE — 3074F SYST BP LT 130 MM HG: CPT | Performed by: NURSE PRACTITIONER

## 2024-06-26 PROCEDURE — 80053 COMPREHEN METABOLIC PANEL: CPT | Performed by: NURSE PRACTITIONER

## 2024-06-26 RX ORDER — AMOXICILLIN AND CLAVULANATE POTASSIUM 875; 125 MG/1; MG/1
TABLET, FILM COATED ORAL
COMMUNITY
Start: 2024-06-25

## 2024-06-26 NOTE — PROGRESS NOTES
HPI:     Patient presents today for diabetes follow up.    Current Weight: 219 lb  Body mass index is 32.34 kg/m².  Current DM Medications: Metformin 1000 mg twice daily, Lantus 12 units nightly  Medication Side Effects: ED from antihypertensive   Previously Tried Medications/Methods: None  ACE/ARB: Lisinopril/hydrochlorothiazide  Statin: Atorvastatin   Last Diabetic Eye Exam:  Last Dilated Eye Exam: 12/22/23  Eye Exam shows Diabetic Retinopathy?: No  Last Diabetic Foot Exam: 12/2023  Other new issues or concerns today: None  On antibiotic for stye on left upper lid    Lab Results   Component Value Date    A1C 6.6 (H) 09/13/2023    A1C 6.8 (H) 06/08/2023    A1C 6.2 (H) 09/29/2022    A1C 6.5 (H) 02/16/2022    A1C 6.5 (H) 08/10/2021       No results found for this or any previous visit (from the past 4380 hour(s)).   Cholesterol: 133, done on 9/13/2023.  HDL Cholesterol: 44, done on 9/13/2023.  LDL Cholesterol: 79, done on 9/13/2023.  TriGlycerides 40, done on 9/13/2023.       Current Outpatient Medications   Medication Sig Dispense Refill    amoxicillin clavulanate 875-125 MG Oral Tab       FLUTICASONE PROPIONATE 50 MCG/ACT Nasal Suspension USE 2 SPRAYS NASALLY DAILY. 16 g 0    Insulin Pen Needle (BD PEN NEEDLE SHORT U/F) 31G X 8 MM Does not apply Misc USE AND DISCARD 1 PEN      NEEDLE DAILY. 200 each 1    SILDENAFIL CITRATE 100 MG Oral Tab TAKE 1 TABLET DAILY AS     NEEDED FOR ERECTILE        DYSFUNCTION 12 tablet 0    LISINOPRIL-HYDROCHLOROTHIAZIDE 20-25 MG Oral Tab TAKE 1 TABLET DAILY 30 tablet 0    METFORMIN HCL 1000 MG Oral Tab TAKE 1 TABLET TWICE DAILY  WITH MEALS 180 tablet 0    ATORVASTATIN 80 MG Oral Tab TAKE 1 TABLET DAILY 90 tablet 0    insulin glargine (LANTUS SOLOSTAR) 100 UNIT/ML Subcutaneous Solution Pen-injector INJECT 12 UNITS            SUBCUTANEOUSLY NIGHTLY 15 mL 0    Olopatadine HCl 0.7 % Ophthalmic Solution Apply 1 drop to eye daily. 2.5 mL 0    diclofenac 1 % External Gel Apply 2 g topically 4  (four) times daily. 50 g 0    Glucose Blood (ONETOUCH ULTRA) In Vitro Strip 1 each by Other route in the morning and 1 each before bedtime. 300 strip 0    Fexofenadine-Pseudoephed ER (ALLEGRA-D ALLERGY & CONGESTION) 180-240 MG Oral Tablet 24 Hr Take 1 tablet by mouth daily. 90 tablet 0    Glucose Blood (ONETOUCH ULTRA BLUE) In Vitro Strip TEST ONCE DAILY, AS DIRECTED 100 strip 1    aspirin 81 MG Oral Chew Tab Chew 1 tablet (81 mg total) by mouth.      Omega-3-acid Ethyl Esters 1 g Oral Cap Take 1 capsule (1 g total) by mouth daily.        Past Medical History:    Diabetes (HCC)    Essential hypertension      History reviewed. No pertinent surgical history.   Family History   Problem Relation Age of Onset    Diabetes Father     Heart Disorder Father       Social History     Socioeconomic History    Marital status:    Tobacco Use    Smoking status: Former     Types: Cigars     Passive exposure: Past    Smokeless tobacco: Never    Tobacco comments:     non-smoker   Vaping Use    Vaping status: Never Used   Substance and Sexual Activity    Alcohol use: Yes     Comment: OCC    Drug use: No         REVIEW OF SYSTEMS:   Review of Systems   Constitutional:  Positive for fatigue (occasionally). Negative for chills and fever.   Respiratory:  Negative for cough and shortness of breath.    Cardiovascular:  Negative for chest pain and palpitations.   Endocrine: Negative for polydipsia, polyphagia and polyuria.   Psychiatric/Behavioral:  Negative for sleep disturbance.        EXAM:   /80   Pulse 73   Temp 98 °F (36.7 °C)   Wt 219 lb (99.3 kg)   SpO2 98%   BMI 32.34 kg/m²   Physical Exam  Constitutional:       General: He is not in acute distress.     Appearance: Normal appearance.   Eyes:      General: Vision grossly intact.         Left eye: Hordeolum present.No discharge.      Extraocular Movements: Extraocular movements intact.      Conjunctiva/sclera: Conjunctivae normal.        Comments: No periorbital  erythema, edema or tenderness    Cardiovascular:      Rate and Rhythm: Normal rate and regular rhythm.      Heart sounds: Normal heart sounds.   Pulmonary:      Effort: Pulmonary effort is normal.      Breath sounds: Normal breath sounds.   Neurological:      General: No focal deficit present.      Mental Status: He is alert.   Psychiatric:         Mood and Affect: Mood normal.         ASSESSMENT AND PLAN:   Diagnoses and all orders for this visit:    Type 2 diabetes mellitus without complication, with long-term current use of insulin (HCC)  -     Hemoglobin A1C [E]  -     Comp Metabolic Panel (14) [E]  -     Lipid Panel [E]    Essential hypertension, benign    Other hyperlipidemia    Hordeolum internum of left upper eyelid    Erectile dysfunction, unspecified erectile dysfunction type    Lab work today  If labs are stable, CPM  Decline vaccinations  Return to clinic in 6 months for physical and follow up

## 2024-06-27 DIAGNOSIS — E87.6 LOW SERUM POTASSIUM: Primary | ICD-10-CM

## 2024-06-27 RX ORDER — SILDENAFIL 100 MG/1
100 TABLET, FILM COATED ORAL DAILY PRN
Qty: 18 TABLET | Refills: 0 | Status: SHIPPED | OUTPATIENT
Start: 2024-06-27

## 2024-06-27 NOTE — TELEPHONE ENCOUNTER
Rx resent with updated quantity      SILDENAFIL CITRATE 100 MG Oral Tab 12 tablet 0 6/17/2024 --    Sig - Route: TAKE 1 TABLET DAILY AS     NEEDED FOR ERECTILE        DYSFUNCTION - Oral      Genitourinary Medications [id number]/27/2024 07:16 AM   Protocol Details Patient does not have pulmonary hypertension on problem list    In person appointment or virtual visit in the past 12 mos or appointment in next 3 mos

## 2024-06-27 NOTE — TELEPHONE ENCOUNTER
From: Russ Padilla Jr.  To: Talya Snyder  Sent: 6/26/2024 4:49 PM CDT  Subject: Sildenafil 100mg    Can you send an updated [facility]. The 90 day prescription currently on file is creating a problem for the.  Please submit a quantity of 18 for 75 days

## 2024-07-08 ENCOUNTER — ORDER TRANSCRIPTION (OUTPATIENT)
Dept: PHYSICAL THERAPY | Facility: HOSPITAL | Age: 54
End: 2024-07-08

## 2024-07-08 ENCOUNTER — TELEPHONE (OUTPATIENT)
Dept: FAMILY MEDICINE CLINIC | Facility: CLINIC | Age: 54
End: 2024-07-08

## 2024-07-08 DIAGNOSIS — M17.12 PRIMARY OSTEOARTHRITIS OF LEFT KNEE: ICD-10-CM

## 2024-07-08 DIAGNOSIS — M67.88 ACHILLES TENDINOSIS: Primary | ICD-10-CM

## 2024-07-08 DIAGNOSIS — S83.412A SPRAIN OF MEDIAL COLLATERAL LIGAMENT OF LEFT KNEE, INITIAL ENCOUNTER: Primary | ICD-10-CM

## 2024-07-08 NOTE — TELEPHONE ENCOUNTER
Patient called said he needs a referral to see podiatrist for his achilles tendon. Per patient he has Appointment scheduled with DR. Brian Olea on 07/30. Per patient he has been seen for this before.

## 2024-07-10 RX ORDER — LISINOPRIL AND HYDROCHLOROTHIAZIDE 25; 20 MG/1; MG/1
1 TABLET ORAL DAILY
Qty: 30 TABLET | Refills: 0 | Status: SHIPPED | OUTPATIENT
Start: 2024-07-10

## 2024-07-10 NOTE — TELEPHONE ENCOUNTER
Lisinopril last refilled 6/13/24  LOV with Ammy 6/26/24  Last labs 6/26/24      Hypertension Medications Protocol Entzyu62/10/2024 01:08 AM   Protocol Details CMP or BMP in past 12 months    Last BP reading less than 140/90    In person appointment or virtual visit in the past 12 mos or appointment in next 3 mos    EGFRCR or GFRAA > 50

## 2024-07-15 RX ORDER — FLUTICASONE PROPIONATE 50 MCG
2 SPRAY, SUSPENSION (ML) NASAL DAILY
Qty: 16 G | Refills: 0 | Status: SHIPPED | OUTPATIENT
Start: 2024-07-15

## 2024-07-15 NOTE — TELEPHONE ENCOUNTER
Allergy Medication Protocol Ggovkn78/15/2024 07:06 AM   Protocol Details In person appointment or virtual visit in the past 12 mos or appointment in next 3 mos     Request for FLUTICASONE PROPIONATE 50 MCG/ACT Nasal Suspension     LOV 6/26/24 with Olga   Last refill 6/21/24 - 16 g 0 refill   Future Appointments   Date Time Provider Department Center   7/23/2024  3:00 PM Lois Rodriguez, PT [facility]   7/30/2024  3:45 PM Sundar Olea DPM [id number]   8/6/2024  3:45 PM Lois Rodriguez, PT [facility]   8/9/2024  3:45 PM Luz Jones, PT [facility]   8/13/2024  3:45 PM Lois Rodriguez, PT [facility] [place]   8/15/2024  3:00 PM Lois Rodriguez, PT [facility]   Labs 6/26/24

## 2024-07-17 ENCOUNTER — LAB ENCOUNTER (OUTPATIENT)
Dept: LAB | Age: 54
End: 2024-07-17
Attending: NURSE PRACTITIONER
Payer: COMMERCIAL

## 2024-07-17 DIAGNOSIS — E87.6 LOW SERUM POTASSIUM: ICD-10-CM

## 2024-07-17 LAB — POTASSIUM SERPL-SCNC: 3.5 MMOL/L (ref 3.5–5.1)

## 2024-07-17 PROCEDURE — 84132 ASSAY OF SERUM POTASSIUM: CPT | Performed by: NURSE PRACTITIONER

## 2024-07-19 ENCOUNTER — TELEPHONE (OUTPATIENT)
Dept: PHYSICAL THERAPY | Facility: HOSPITAL | Age: 54
End: 2024-07-19

## 2024-07-23 ENCOUNTER — OFFICE VISIT (OUTPATIENT)
Dept: PHYSICAL THERAPY | Age: 54
End: 2024-07-23
Attending: FAMILY MEDICINE
Payer: COMMERCIAL

## 2024-07-23 DIAGNOSIS — S83.412A SPRAIN OF MEDIAL COLLATERAL LIGAMENT OF LEFT KNEE, INITIAL ENCOUNTER: Primary | ICD-10-CM

## 2024-07-23 DIAGNOSIS — M17.12 PRIMARY OSTEOARTHRITIS OF LEFT KNEE: ICD-10-CM

## 2024-07-23 PROCEDURE — 97110 THERAPEUTIC EXERCISES: CPT

## 2024-07-23 PROCEDURE — 97161 PT EVAL LOW COMPLEX 20 MIN: CPT

## 2024-07-23 NOTE — PROGRESS NOTES
LOWER EXTREMITY EVALUATION:          Diagnosis:    Sprain of medial collateral ligament of left knee, initial encounter (S83.412A)  Primary osteoarthritis of left knee (M17.12)   Referring Provider: Destin Antonio  Date of Evaluation:    7/23/2024    Precautions:   diabetes and HTN Next MD visit:   none scheduled  Date of Surgery: n/a     PATIENT SUMMARY   Russ Padilla Jr. is a 53 year old male who presents to therapy today with complaints of L knee pain. In June, slipped playing golf.  Pain is worse with rolling and turning. Pain 2 days after goal.   Also experiencing L Achilles tendonitis and seeing foot doctor.    Pt describes pain level current 0-1/10, at best 0/10, at worst 3/10.  With the first injury 9-10  Worse :  rolling in bed, wake up with pain, day after golf, sit to stand if chair to low  Better : Motrin helps, adjust chair    Xray L knee 9/2023: No evidence of an acute process.   2. Mild sharpening of the tibial spine suspicious for underlying chondromalacia versus early osteoarthritis.   3. Mild extensor enthesopathy.     X ray R knee 9/2023: 1. No acute process suggested.  Mild sharpening of the tibial spines, suspicious for chondromalacia versus early osteoarthritis.   2. Stable extensor enthesopathy.   3. Mild suprapatellar joint effusion.     Current functional limitations include rolling, transfer out of bed in morning, rotational movements with golf, sit to stand from low chair surface.      Job: tech job  Hobbies: golf, motor cycling  Russ describes prior level of function no knee pain after golfing. Pt goals include less pain in morning after days after golf.    Past medical history was reviewed with Russ. Significant findings include   has a past medical history of Diabetes (HCC) and Essential hypertension.      ASSESSMENT  Russ presents to physical therapy evaluation with primary c/o L knee pain. The results of the objective tests and measures show tightness in hip ER, hip flexor,  quad, hamstring and calf muscles. He presents with weakness in gluteals. He has restricted eversion in talocrural joint. Patient presents with mild over pronation. Patient has normal strength of ligamentous structures of knee and negative meniscal findings. Patient is alternates from settled stage of condition to subacute stage of condition after golfing. May limitation is restrictions at hips that could be provoking knee during golf.  Functional deficits include but are not limited to rolling, transfer out of bed in morning, rotational movements with golf, sit to stand from low chair surface.  Signs and symptoms are consistent with diagnosis of Sprain of medial collateral ligament of left knee, initial encounter (S83.412A) and Primary osteoarthritis of left knee (M17.12). Pt and PT discussed evaluation findings, pathology, POC and HEP.  Pt voiced understanding and performs HEP correctly without reported pain. Skilled Physical Therapy is medically necessary to address the above impairments and reach functional goals. Patient will benefit from therapy to receive manual therapy for joint and soft tissue manipulation; neuromuscular re-education for hip and ankle coordination and therapeutic exercise for hip stretching/gluteal strengthening/ LE stretches and TC joint manipulation. Patient has appropriate foot wear at this time.     Patient is traveling next week and will return to PT first week of August.        OBJECTIVE:   Observation: mild pronation bilaterall  Palpation: tender medial joint L   Sensation: LT intact    AROM: (* denotes performed with pain)  Hip Knee Foot/Ankle   Flexion: R wnl; L wnl  Extension: R 5; L 5  Abduction: R wnl; L wnl  ER: R 50%; L 50%  IR: R wnl; L wnl Flexion: R wnl; L wnl with pinch at end range   Extension: R 0; L 0    WNL except  DF: R 5; L 5       Accessory motion: Grade 1 restriction of TC eversion     Flexibility:  Hip Flexor: R 5, L 5  Hamstrings: R 65 hip flex with knee straight  ; L 65 hip flex with knee straight  Piriformis: R restricted ; L restricted   Quads: R 95; L 95  Gastroc-soleus: R5; L 5    Strength/MMT: (* denotes performed with pain)  Hip Knee Foot/Ankle   Flexion: R 5/5; L 5/5  Extension: R 4-/5; L 4-/5  Abduction: R 4-/5; L 4-/5  ER: R 4/5; L 4/5  IR: R 5/5; L 5/5 Flexion: R 5/5; L 5/5  Extension: R 5/5; L 5/5    DF: R 5/5; L 5/5  PF: R 5/5; L 5/5       Special tests:   Negative B varus and valgus test, negative Carmen test    Gait: pt ambulates on level ground with normal  Balance: not tested at this time     Today’s Treatment and Response:   Pt education was provided on exam findings, treatment diagnosis, treatment plan, expectations, and prognosis. Pt was also provided recommendations for possible soreness after evaluation and edema management, hip mechanics affect on knee .  Patient was instructed in and issued a HEP for:   Access Code: [email or web address]  URL: [email or web address]  Date: 07/23/2024  Prepared by: Lois Rodriguez    Exercises  - Supine Figure 4 Piriformis Stretch  - 2 x daily - 7 x weekly - 1 sets - 4 reps - 30 hold  - Supine Lumbar Rotation Stretch  - 2 x daily - 7 x weekly - 1 sets - 4 reps - 30 hold    Charges: PT Eval Low Complexity, Ex      Total Timed Treatment: 45 min     Total Treatment Time: 45 min     Goals: (to be met in 6 visits)    Patient will demonstrate independence in performing home exercise program.  Patient will demonstrate full hip ER to reduce stress at knee with functional rotational movements.   Patient will demonstrate increase hip extension to 10 degrees to reduce knee strain during gait with push off phase of gait.   Patient will report 0-1/10 pain in morning majority of mornings during the week.   Patient will demonstrate hamstring flexibility WNL  Patient will demonstrate 10 DF with knee extension to improve shock absorption of knee during gait.     Frequency / Duration: Patient will be seen for 2x/week or a total of 6  visits over a 90 day period. Treatment will include: Manual Therapy, Neuromuscular Re-education, Therapeutic Exercise, and Home Exercise Program instruction    Education or treatment limitation: None  Rehab Potential:good    LEFS Score  LEFS Score: 95 % (7/23/2024  1:38 PM)      Patient/Family/Caregiver was advised of these findings, precautions, and treatment options and has agreed to actively participate in planning and for this course of care.    Thank you for your referral. Please co-sign or sign and return this letter via fax as soon as possible to [phone]. If you have any questions, please contact me at Dept: [phone]    Sincerely,  Electronically signed by therapist: Lois Rodriguez, PT  Physician's certification required: Yes  I certify the need for these services furnished under this plan of treatment and while under my care.    X___________________________________________________ Date____________________    Certification From: 7/23/2024  To:10/21/2024

## 2024-07-24 ENCOUNTER — TELEPHONE (OUTPATIENT)
Dept: PHYSICAL THERAPY | Facility: HOSPITAL | Age: 54
End: 2024-07-24

## 2024-07-30 ENCOUNTER — OFFICE VISIT (OUTPATIENT)
Facility: LOCATION | Age: 54
End: 2024-07-30
Payer: COMMERCIAL

## 2024-07-30 DIAGNOSIS — M76.61 ACHILLES TENDINITIS OF BOTH LOWER EXTREMITIES: Primary | ICD-10-CM

## 2024-07-30 DIAGNOSIS — M62.461 GASTROCNEMIUS EQUINUS OF RIGHT LOWER EXTREMITY: ICD-10-CM

## 2024-07-30 DIAGNOSIS — M79.672 PAIN OF LEFT HEEL: ICD-10-CM

## 2024-07-30 DIAGNOSIS — M76.62 ACHILLES TENDINITIS OF BOTH LOWER EXTREMITIES: Primary | ICD-10-CM

## 2024-07-30 DIAGNOSIS — M62.462 GASTROCNEMIUS EQUINUS OF LEFT LOWER EXTREMITY: ICD-10-CM

## 2024-07-30 DIAGNOSIS — M79.671 PAIN OF RIGHT HEEL: ICD-10-CM

## 2024-07-30 PROCEDURE — 99203 OFFICE O/P NEW LOW 30 MIN: CPT | Performed by: PODIATRIST

## 2024-07-30 RX ORDER — NAPROXEN 500 MG/1
500 TABLET ORAL 2 TIMES DAILY WITH MEALS
Qty: 30 TABLET | Refills: 1 | Status: SHIPPED | OUTPATIENT
Start: 2024-07-30 | End: 2024-08-29

## 2024-07-30 NOTE — PROGRESS NOTES
[facility]  Progress Note    Russ Padilla Jr. is a 53 year old male.   Chief Complaint   Patient presents with    New Patient     Patient has injured bilateral achilles tendon years ago. He was playing tennis last year and noticed the pain then, today in the office he rates pain 4/10. Patient denies any numbness / tingling. He has taken oral steroid pack with success.         HPI:     Patient is a pleasant 53-year-old male who is presenting to clinic today with complaints of bilateral Achilles pain.  Patient does have a history of Achilles tendinitis to both of his Achilles a few years ago.  He states that he was utilizing nonsupportive tennis shoes and noticed pain right after.  He did take some anti-inflammatories and did stretching at that time, which was beneficial.  He was recently playing tennis with a neighbor and has noticed intermittent pain to the back of his heels, particularly near the insertion of the Achilles.  Rates his pain 4/10.  He states that it does not occur all the time and usually flares up with increased activities.  No other concerns today and here for further evaluation and care.  Denies recent nausea, vomiting, fevers, or chills.      Allergies: Patient has no known allergies.   Current Outpatient Medications   Medication Sig Dispense Refill    Fexofenadine HCl 30 MG/5ML Oral Suspension Take 5 mL (30 mg total) by mouth as needed.      naproxen 500 MG Oral Tab Take 1 tablet (500 mg total) by mouth 2 (two) times daily with meals. 30 tablet 1    FLUTICASONE PROPIONATE 50 MCG/ACT Nasal Suspension USE 2 SPRAYS NASALLY DAILY. 16 g 0    LISINOPRIL-HYDROCHLOROTHIAZIDE 20-25 MG Oral Tab TAKE 1 TABLET DAILY 30 tablet 0    Sildenafil Citrate 100 MG Oral Tab Take 1 tablet (100 mg total) by mouth daily as needed for Erectile Dysfunction. 18 tablet 0    Insulin Pen Needle (BD PEN NEEDLE SHORT U/F) 31G X 8 MM Does not apply Misc USE AND DISCARD 1 PEN      NEEDLE DAILY. 200 each 1     METFORMIN HCL 1000 MG Oral Tab TAKE 1 TABLET TWICE DAILY  WITH MEALS 180 tablet 0    ATORVASTATIN 80 MG Oral Tab TAKE 1 TABLET DAILY 90 tablet 0    insulin glargine (LANTUS SOLOSTAR) 100 UNIT/ML Subcutaneous Solution Pen-injector INJECT 12 UNITS            SUBCUTANEOUSLY NIGHTLY 15 mL 0    Olopatadine HCl 0.7 % Ophthalmic Solution Apply 1 drop to eye daily. 2.5 mL 0    diclofenac 1 % External Gel Apply 2 g topically 4 (four) times daily. 50 g 0    Glucose Blood (ONETOUCH ULTRA) In Vitro Strip 1 each by Other route in the morning and 1 each before bedtime. 300 strip 0    Glucose Blood (ONETOUCH ULTRA BLUE) In Vitro Strip TEST ONCE DAILY, AS DIRECTED 100 strip 1    aspirin 81 MG Oral Chew Tab Chew 1 tablet (81 mg total) by mouth.      Omega-3-acid Ethyl Esters 1 g Oral Cap Take 1 capsule (1 g total) by mouth daily.        Past Medical History:    Diabetes (HCC)    Essential hypertension      No past surgical history on file.   Family History   Problem Relation Age of Onset    Diabetes Father     Heart Disorder Father       Social History     Socioeconomic History    Marital status:    Tobacco Use    Smoking status: Former     Types: Cigars     Passive exposure: Past    Smokeless tobacco: Never    Tobacco comments:     non-smoker   Vaping Use    Vaping status: Never Used   Substance and Sexual Activity    Alcohol use: Yes     Comment: OCC    Drug use: No           REVIEW OF SYSTEMS:     10 point ROS completed and was negative, except for pertinent positive and negatives stated in subjective.       EXAM:     GENERAL: well developed, well nourished, in no apparent distress  EXTREMITIES:  1. Integument: Skin appears moist, warm, and supple with positive hair growth. There are no color changes. No open lesions. No macerations. No Hyperkeratotic lesions.   2. Vascular: Dorsalis pedis 2/4 bilateral and posterior tibial pulses 2/4 bilateral, capillary refill normal.  3. Neurological: Gross sensation intact via light  touch bilaterally.  Normal sharp/dull sensation  4. Musculoskeletal: All muscle groups are graded 5/5 in the foot and ankle with some pain elicited to bilateral Achilles with plantarflexion against resistance.  Pain elicited with palpation to insertion of Achilles tendon to bilateral lower extremities.  There is a increase in thickness noted to the mid substance of the right Achilles tendon, which has no pain on exam today.       ASSESSMENT AND PLAN:   Diagnoses and all orders for this visit:    Achilles tendinitis of both lower extremities  -     OP REFERRAL TO [facility]    Gastrocnemius equinus of left lower extremity  -     OP REFERRAL TO [facility]    Gastrocnemius equinus of right lower extremity  -     OP REFERRAL TO [facility]    Pain of left heel  -     OP REFERRAL TO [facility]    Pain of right heel  -     OP REFERRAL TO [facility]    Other orders  -     naproxen 500 MG Oral Tab; Take 1 tablet (500 mg total) by mouth 2 (two) times daily with meals.        Plan:   -Patient was seen and evaluated today in clinic.  Chart history reviewed.    Patient seen and evaluated today in clinic.  All relevant clinic notes and imaging were reviewed prior to visit.  Discussed findings, which are consistent with Achilles tendinitis of bilateral lower extremities.  Discussed conservative management and potential for formal PT.  Discussed possible oral steroids if patient is not diabetic, otherwise use of NSAIDS if no history of GERD or stomach upset.  Recommend cryotherapy/icing.  Discussed the importance of rest and the need for immobilization.  Recommend use of OTC and/or custom orthotics in the treatment and future prevention of tendinitis.  Supportive shoe gear recommendation was also given to patient today.  Provided patient with recommendations for over-the-counter supportive inserts  Recommend use of compression  stockings vs. ACE wrap to control edema/swelling.  Discussed potential need to order MRI if conservative management fails to resolve symptoms out of concern for underlying tear.  Discussed potential need for surgical intervention if conservative management fails to resolve symptoms.    Patient does elect for formal physical therapy.  A referral was placed today    Rx: Naproxen    Recommend patient avoid any activities that increases pain.      -The patient indicates understanding of these issues and agrees to the plan.      RTC 4-6 weeks      Brian Olea DPM        7/30/2024    Dragon speech recognition software was used to prepare this note.  Errors in word recognition may occur.  Please contact me with any questions/concerns with this note.

## 2024-08-06 ENCOUNTER — OFFICE VISIT (OUTPATIENT)
Dept: PHYSICAL THERAPY | Age: 54
End: 2024-08-06
Attending: FAMILY MEDICINE
Payer: COMMERCIAL

## 2024-08-06 PROCEDURE — 97110 THERAPEUTIC EXERCISES: CPT

## 2024-08-06 PROCEDURE — 97140 MANUAL THERAPY 1/> REGIONS: CPT

## 2024-08-06 NOTE — PROGRESS NOTES
Diagnosis:   Sprain of medial collateral ligament of left knee, initial encounter (S83.412A)  Primary osteoarthritis of left knee (M17.12)      Referring Provider: Destin Antonio  Date of Evaluation:     7/23/2024    Precautions:   diabetes and HTN Next MD visit:   none scheduled  Date of Surgery: n/a   Insurance Primary/Secondary: [employer] [place] P[place] / N/A     # Auth Visits: 6 POC            Subjective: Soreness Monday. Golfed Saturday.    Pain: 1-3/10      Objective:   Normal Hamstring flexibility on both sides after stretching.   Grade 1 tib fib AP and lateral. Grade 2 at end of session      Assessment: Patient tolerate initiation of stretching. Next session, continue to address hip flexor stretching and progress to standing hip flexor stretch. Continue with tib fib mobs to improve lateral rotation required for golf.       Goals:   (to be met in 6 visits)    Patient will demonstrate independence in performing home exercise program.  Patient will demonstrate full hip ER to reduce stress at knee with functional rotational movements.   Patient will demonstrate increase hip extension to 10 degrees to reduce knee strain during gait with push off phase of gait.   Patient will report 0-1/10 pain in morning majority of mornings during the week.   Patient will demonstrate hamstring flexibility WNL  Patient will demonstrate 10 DF with knee extension to improve shock absorption of knee during gait.    Plan: Continue PT to restore joint mobility, LE stretching and gluteal strengthening.   Date: 8/6/2024  TX#: 2/6 Date:                 TX#: 3/ Date:                 TX#: 4/ Date:                 TX#: 5/ Date:   Tx#: 6/   TE 35 min  Nu Step WL 5, 5 min  Hamstring stretch 30 sec  R x 4  L x 4  Hip flexor 30 sec   R x 4  L x 4  Quad stretch   R 30 sec x 5  L 30 sec x 5         Issue HO in [employer] [facility]         MT: 10 min  left  Quad strumming and muscle play       GIV PF lateral to medial  Tib fem   AP GIV  AP medial and lateral GIV        HEP:   .Access Code: [email or web address]  URL: [email or web address]  Date: 07/23/2024  Prepared by: Lois Rodriguez    Exercises  - Supine Figure 4 Piriformis Stretch  - 2 x daily - 7 x weekly - 1 sets - 4 reps - 30 hold  - Supine Lumbar Rotation Stretch  - 2 x daily - 7 x weekly - 1 sets - 4 reps - 30 hold    Charges: TE 2, MT 1       Total Timed Treatment: 45 min  Total Treatment Time: 45 min      LOWER EXTREMITY EVALUATION:          Diagnosis:    Sprain of medial collateral ligament of left knee, initial encounter (S83.412A)  Primary osteoarthritis of left knee (M17.12)   Referring Provider: Destin Antonio  Date of Evaluation:    7/23/2024    Precautions:   diabetes and HTN Next MD visit:   none scheduled  Date of Surgery: n/a     PATIENT SUMMARY   Russ Padilla Jr. is a 53 year old male who presents to therapy today with complaints of L knee pain. In June, slipped playing golf.  Pain is worse with rolling and turning. Pain 2 days after goal.   Also experiencing L Achilles tendonitis and seeing foot doctor.    Pt describes pain level current 0-1/10, at best 0/10, at worst 3/10.  With the first injury 9-10  Worse :  rolling in bed, wake up with pain, day after golf, sit to stand if chair to low  Better : Motrin helps, adjust chair    Xray L knee 9/2023: No evidence of an acute process.   2. Mild sharpening of the tibial spine suspicious for underlying chondromalacia versus early osteoarthritis.   3. Mild extensor enthesopathy.     X ray R knee 9/2023: 1. No acute process suggested.  Mild sharpening of the tibial spines, suspicious for chondromalacia versus early osteoarthritis.   2. Stable extensor enthesopathy.   3. Mild suprapatellar joint effusion.     Current functional limitations include rolling, transfer out of bed in morning, rotational movements with golf, sit to stand from low chair surface.      Job: tech job  Hobbies: golf, motor cycling  Russ describes prior level of function no knee pain after  golfing. Pt goals include less pain in morning after days after golf.    Past medical history was reviewed with Russ. Significant findings include   has a past medical history of Diabetes (HCC) and Essential hypertension.      ASSESSMENT  Russ presents to physical therapy evaluation with primary c/o L knee pain. The results of the objective tests and measures show tightness in hip ER, hip flexor, quad, hamstring and calf muscles. He presents with weakness in gluteals. He has restricted eversion in talocrural joint. Patient presents with mild over pronation. Patient has normal strength of ligamentous structures of knee and negative meniscal findings. Patient is alternates from settled stage of condition to subacute stage of condition after golfing. May limitation is restrictions at hips that could be provoking knee during golf.  Functional deficits include but are not limited to rolling, transfer out of bed in morning, rotational movements with golf, sit to stand from low chair surface.  Signs and symptoms are consistent with diagnosis of Sprain of medial collateral ligament of left knee, initial encounter (S83.412A) and Primary osteoarthritis of left knee (M17.12). Pt and PT discussed evaluation findings, pathology, POC and HEP.  Pt voiced understanding and performs HEP correctly without reported pain. Skilled Physical Therapy is medically necessary to address the above impairments and reach functional goals. Patient will benefit from therapy to receive manual therapy for joint and soft tissue manipulation; neuromuscular re-education for hip and ankle coordination and therapeutic exercise for hip stretching/gluteal strengthening/ LE stretches and TC joint manipulation. Patient has appropriate foot wear at this time.     Patient is traveling next week and will return to PT first week of August.        OBJECTIVE:   Observation: mild pronation bilaterall  Palpation: tender medial joint L   Sensation: LT  intact    AROM: (* denotes performed with pain)  Hip Knee Foot/Ankle   Flexion: R wnl; L wnl  Extension: R 5; L 5  Abduction: R wnl; L wnl  ER: R 50%; L 50%  IR: R wnl; L wnl Flexion: R wnl; L wnl with pinch at end range   Extension: R 0; L 0    WNL except  DF: R 5; L 5       Accessory motion: Grade 1 restriction of TC eversion     Flexibility:  Hip Flexor: R 5, L 5  Hamstrings: R 65 hip flex with knee straight ; L 65 hip flex with knee straight  Piriformis: R restricted ; L restricted   Quads: R 95; L 95  Gastroc-soleus: R5; L 5    Strength/MMT: (* denotes performed with pain)  Hip Knee Foot/Ankle   Flexion: R 5/5; L 5/5  Extension: R 4-/5; L 4-/5  Abduction: R 4-/5; L 4-/5  ER: R 4/5; L 4/5  IR: R 5/5; L 5/5 Flexion: R 5/5; L 5/5  Extension: R 5/5; L 5/5    DF: R 5/5; L 5/5  PF: R 5/5; L 5/5       Special tests:   Negative B varus and valgus test, negative Carmen test    Gait: pt ambulates on level ground with normal  Balance: not tested at this time     Today’s Treatment and Response:   Pt education was provided on exam findings, treatment diagnosis, treatment plan, expectations, and prognosis. Pt was also provided recommendations for possible soreness after evaluation and edema management, hip mechanics affect on knee .  Patient was instructed in and issued a HEP for:   Access Code: [email or web address]  URL: [email or web address]  Date: 07/23/2024  Prepared by: Lois Rodriguez    Exercises  - Supine Figure 4 Piriformis Stretch  - 2 x daily - 7 x weekly - 1 sets - 4 reps - 30 hold  - Supine Lumbar Rotation Stretch  - 2 x daily - 7 x weekly - 1 sets - 4 reps - 30 hold    Charges: PT Eval Low Complexity, Ex      Total Timed Treatment: 45 min     Total Treatment Time: 45 min     Goals: (to be met in 6 visits)    Patient will demonstrate independence in performing home exercise program.  Patient will demonstrate full hip ER to reduce stress at knee with functional rotational movements.   Patient will demonstrate  increase hip extension to 10 degrees to reduce knee strain during gait with push off phase of gait.   Patient will report 0-1/10 pain in morning majority of mornings during the week.   Patient will demonstrate hamstring flexibility WNL  Patient will demonstrate 10 DF with knee extension to improve shock absorption of knee during gait.     Frequency / Duration: Patient will be seen for 2x/week or a total of 6 visits over a 90 day period. Treatment will include: Manual Therapy, Neuromuscular Re-education, Therapeutic Exercise, and Home Exercise Program instruction    Education or treatment limitation: None  Rehab Potential:good    LEFS Score  LEFS Score: 95 % (7/23/2024  1:38 PM)      Patient/Family/Caregiver was advised of these findings, precautions, and treatment options and has agreed to actively participate in planning and for this course of care.    Thank you for your referral. Please co-sign or sign and return this letter via fax as soon as possible to [phone]. If you have any questions, please contact me at Dept: [phone]    Sincerely,  Electronically signed by therapist: Lois Rodriguez, PT  Physician's certification required: Yes  I certify the need for these services furnished under this plan of treatment and while under my care.    X___________________________________________________ Date____________________    Certification From: 7/23/2024  To:10/21/2024

## 2024-08-07 RX ORDER — FLUTICASONE PROPIONATE 50 MCG
2 SPRAY, SUSPENSION (ML) NASAL DAILY
Qty: 16 G | Refills: 0 | Status: SHIPPED | OUTPATIENT
Start: 2024-08-07

## 2024-08-07 RX ORDER — ATORVASTATIN CALCIUM 80 MG/1
80 TABLET, FILM COATED ORAL DAILY
Qty: 90 TABLET | Refills: 0 | Status: SHIPPED | OUTPATIENT
Start: 2024-08-07

## 2024-08-07 RX ORDER — ATORVASTATIN CALCIUM 80 MG/1
80 TABLET, FILM COATED ORAL DAILY
Qty: 90 TABLET | Refills: 0 | OUTPATIENT
Start: 2024-08-07

## 2024-08-07 NOTE — TELEPHONE ENCOUNTER
Atorvastatin last refilled 5/20/24  Nasal spray last refilled 7/15/24  No future appointment  LOV with Ammy 6/26/24  Last labs 6/26/24      Cholesterol Medication Protocol Vvddot3308/07/2024 01:21 AM   Protocol Details ALT < 80    ALT resulted within past year    Lipid panel within past 12 months    In person appointment or virtual visit in the past 12 mos or appointment in next 3 mos          Allergy Medication Protocol [id number]/07/2024 01:21 AM   Protocol Details In person appointment or virtual visit in the past 12 mos or appointment in next 3 mos

## 2024-08-09 ENCOUNTER — OFFICE VISIT (OUTPATIENT)
Dept: PHYSICAL THERAPY | Age: 54
End: 2024-08-09
Attending: FAMILY MEDICINE
Payer: COMMERCIAL

## 2024-08-09 PROCEDURE — 97140 MANUAL THERAPY 1/> REGIONS: CPT

## 2024-08-09 PROCEDURE — 97110 THERAPEUTIC EXERCISES: CPT

## 2024-08-09 RX ORDER — LISINOPRIL AND HYDROCHLOROTHIAZIDE 25; 20 MG/1; MG/1
1 TABLET ORAL DAILY
Qty: 30 TABLET | Refills: 0 | Status: SHIPPED | OUTPATIENT
Start: 2024-08-09

## 2024-08-09 NOTE — PROGRESS NOTES
Diagnosis:   Sprain of medial collateral ligament of left knee, initial encounter (S83.412A)  Primary osteoarthritis of left knee (M17.12)      Referring Provider: Destin Antonio  Date of Evaluation:     7/23/2024    Precautions:   diabetes and HTN Next MD visit:   none scheduled  Date of Surgery: n/a   Insurance Primary/Secondary: [employer] [place] / N/A     # Auth Visits: 6 POC            Subjective: \"I was sore after last session.  Not too bad today.\"    Pain: 1-2/10      Objective:   Normal Hamstring flexibility on both sides after stretching.   Grade 1 tib fib AP and lateral. Grade 2 at end of session      Assessment: Incorporated IASTM to assist in decreasing patellar tendon tension.  Also added quad strengthening and core stabilization exercises today without reports of increased pain.  Pt cont to demonstrate hip flexor tightness, which also included tightness in the ITB.  Utilized roller to decrease STRs along the ITB.  Pt reported no pain at the end of today's session.  Will cont to progress per primary therapist.      Goals:   (to be met in 6 visits)    Patient will demonstrate independence in performing home exercise program.  Patient will demonstrate full hip ER to reduce stress at knee with functional rotational movements.   Patient will demonstrate increase hip extension to 10 degrees to reduce knee strain during gait with push off phase of gait.   Patient will report 0-1/10 pain in morning majority of mornings during the week.   Patient will demonstrate hamstring flexibility WNL  Patient will demonstrate 10 DF with knee extension to improve shock absorption of knee during gait.    Plan: Continue PT to restore joint mobility, LE stretching and gluteal strengthening.   Date: 8/6/2024  TX#: 2/6 Date: 8/9/24                TX#: 3/6 Date:                 TX#: 4/ Date:                 TX#: 5/ Date:   Tx#: 6/   TE 35 min  Nu Step WL 5, 5 min  Hamstring stretch 30 sec  R x 4  L x 4  Hip flexor 30 sec   R x 4  L x  4  Quad stretch   R 30 sec x 5  L 30 sec x 5   Therapeutic Exercises:  35 mins  Upright Bike  6 mins  Slant Board Stretch  3x30 secs  Supine Hip Flexor Stretch  3x30 secs  Quad Sets  20x5 secs  Ball Squeezes  20x5 secs  Hip abd w/ Ring  20x5 secs  Mini squat @ Table  3x10      Issue HO in [employer] [facility]         MT: 10 min  left  Quad strumming and muscle play Manual Therapy:  10 mins  IASTM to L inf patella/patellar tendon and manual rolling to the L ITB      GIV PF lateral to medial  Tib fem   AP GIV  AP medial and lateral GIV       HEP:   .Access Code: [email or web address]  URL: [email or web address]  Date: 07/23/2024  Prepared by: Lois Rodriguez    Exercises  - Supine Figure 4 Piriformis Stretch  - 2 x daily - 7 x weekly - 1 sets - 4 reps - 30 hold  - Supine Lumbar Rotation Stretch  - 2 x daily - 7 x weekly - 1 sets - 4 reps - 30 hold    Charges: TE 2, MT 1       Total Timed Treatment: 45 min  Total Treatment Time: 45 min    LOWER EXTREMITY EVALUATION:          Diagnosis:    Sprain of medial collateral ligament of left knee, initial encounter (S83.412A)  Primary osteoarthritis of left knee (M17.12)   Referring Provider: Destin Antonio  Date of Evaluation:    7/23/2024    Precautions:   diabetes and HTN Next MD visit:   none scheduled  Date of Surgery: n/a     PATIENT SUMMARY   Russ Padilla Jr. is a 53 year old male who presents to therapy today with complaints of L knee pain. In June, slipped playing golf.  Pain is worse with rolling and turning. Pain 2 days after goal.   Also experiencing L Achilles tendonitis and seeing foot doctor.    Pt describes pain level current 0-1/10, at best 0/10, at worst 3/10.  With the first injury 9-10  Worse :  rolling in bed, wake up with pain, day after golf, sit to stand if chair to low  Better : Motrin helps, adjust chair    Xray L knee 9/2023: No evidence of an acute process.   2. Mild sharpening of the tibial spine suspicious for underlying chondromalacia versus early  osteoarthritis.   3. Mild extensor enthesopathy.     X ray R knee 9/2023: 1. No acute process suggested.  Mild sharpening of the tibial spines, suspicious for chondromalacia versus early osteoarthritis.   2. Stable extensor enthesopathy.   3. Mild suprapatellar joint effusion.     Current functional limitations include rolling, transfer out of bed in morning, rotational movements with golf, sit to stand from low chair surface.      Job: tech job  Hobbies: golf, motor cycling  Russ describes prior level of function no knee pain after golfing. Pt goals include less pain in morning after days after golf.    Past medical history was reviewed with Russ. Significant findings include   has a past medical history of Diabetes (HCC) and Essential hypertension.      ASSESSMENT  Russ presents to physical therapy evaluation with primary c/o L knee pain. The results of the objective tests and measures show tightness in hip ER, hip flexor, quad, hamstring and calf muscles. He presents with weakness in gluteals. He has restricted eversion in talocrural joint. Patient presents with mild over pronation. Patient has normal strength of ligamentous structures of knee and negative meniscal findings. Patient is alternates from settled stage of condition to subacute stage of condition after golfing. May limitation is restrictions at hips that could be provoking knee during golf.  Functional deficits include but are not limited to rolling, transfer out of bed in morning, rotational movements with golf, sit to stand from low chair surface.  Signs and symptoms are consistent with diagnosis of Sprain of medial collateral ligament of left knee, initial encounter (S83.412A) and Primary osteoarthritis of left knee (M17.12). Pt and PT discussed evaluation findings, pathology, POC and HEP.  Pt voiced understanding and performs HEP correctly without reported pain. Skilled Physical Therapy is medically necessary to address the above impairments  and reach functional goals. Patient will benefit from therapy to receive manual therapy for joint and soft tissue manipulation; neuromuscular re-education for hip and ankle coordination and therapeutic exercise for hip stretching/gluteal strengthening/ LE stretches and TC joint manipulation. Patient has appropriate foot wear at this time.     Patient is traveling next week and will return to PT first week of August.        OBJECTIVE:   Observation: mild pronation bilaterall  Palpation: tender medial joint L   Sensation: LT intact    AROM: (* denotes performed with pain)  Hip Knee Foot/Ankle   Flexion: R wnl; L wnl  Extension: R 5; L 5  Abduction: R wnl; L wnl  ER: R 50%; L 50%  IR: R wnl; L wnl Flexion: R wnl; L wnl with pinch at end range   Extension: R 0; L 0    WNL except  DF: R 5; L 5       Accessory motion: Grade 1 restriction of TC eversion     Flexibility:  Hip Flexor: R 5, L 5  Hamstrings: R 65 hip flex with knee straight ; L 65 hip flex with knee straight  Piriformis: R restricted ; L restricted   Quads: R 95; L 95  Gastroc-soleus: R5; L 5    Strength/MMT: (* denotes performed with pain)  Hip Knee Foot/Ankle   Flexion: R 5/5; L 5/5  Extension: R 4-/5; L 4-/5  Abduction: R 4-/5; L 4-/5  ER: R 4/5; L 4/5  IR: R 5/5; L 5/5 Flexion: R 5/5; L 5/5  Extension: R 5/5; L 5/5    DF: R 5/5; L 5/5  PF: R 5/5; L 5/5       Special tests:   Negative B varus and valgus test, negative Carmen test    Gait: pt ambulates on level ground with normal  Balance: not tested at this time     Today’s Treatment and Response:   Pt education was provided on exam findings, treatment diagnosis, treatment plan, expectations, and prognosis. Pt was also provided recommendations for possible soreness after evaluation and edema management, hip mechanics affect on knee .  Patient was instructed in and issued a HEP for:   Access Code: [email or web address]  URL: [email or web address]  Date: 07/23/2024  Prepared by: Lois  Furto    Exercises  - Supine Figure 4 Piriformis Stretch  - 2 x daily - 7 x weekly - 1 sets - 4 reps - 30 hold  - Supine Lumbar Rotation Stretch  - 2 x daily - 7 x weekly - 1 sets - 4 reps - 30 hold    Charges: PT Golden Low Complexity, Ex      Total Timed Treatment: 45 min     Total Treatment Time: 45 min     Goals: (to be met in 6 visits)    Patient will demonstrate independence in performing home exercise program.  Patient will demonstrate full hip ER to reduce stress at knee with functional rotational movements.   Patient will demonstrate increase hip extension to 10 degrees to reduce knee strain during gait with push off phase of gait.   Patient will report 0-1/10 pain in morning majority of mornings during the week.   Patient will demonstrate hamstring flexibility WNL  Patient will demonstrate 10 DF with knee extension to improve shock absorption of knee during gait.     Frequency / Duration: Patient will be seen for 2x/week or a total of 6 visits over a 90 day period. Treatment will include: Manual Therapy, Neuromuscular Re-education, Therapeutic Exercise, and Home Exercise Program instruction    Education or treatment limitation: None  Rehab Potential:good    LEFS Score  LEFS Score: 95 % (7/23/2024  1:38 PM)      Patient/Family/Caregiver was advised of these findings, precautions, and treatment options and has agreed to actively participate in planning and for this course of care.    Thank you for your referral. Please co-sign or sign and return this letter via fax as soon as possible to [phone]. If you have any questions, please contact me at Dept: [phone]    Sincerely,  Electronically signed by therapist: Luz Jones PT  Physician's certification required: Yes  I certify the need for these services furnished under this plan of treatment and while under my care.    X___________________________________________________ Date____________________    Certification From: 7/23/2024  To:10/21/2024

## 2024-08-09 NOTE — TELEPHONE ENCOUNTER
Hypertension Medications Protocol [id number]/09/2024 07:09 AM   Protocol Details CMP or BMP in past 12 months    Last BP reading less than 140/90    In person appointment or virtual visit in the past 12 mos or appointment in next 3 mos    EGFRCR or GFRAA > 50     LOV 6/26/24 with Talya Snyder APRN   Last refill 7/1021 - 30 tablets 0 refill   Future Appointments   Date Time Provider Department Center   8/9/2024  3:45 PM Luz Jones, PT [facility] [place]   8/13/2024  3:45 PM Lois Rodriguez, PT [facility] [place]   8/15/2024  3:00 PM Lois Rodriguez, PT [facility] [place]   Labs 6/26/24

## 2024-08-09 NOTE — TELEPHONE ENCOUNTER
Diabetes Medication Protocol [id number]/09/2024 07:09 AM   Protocol Details Last A1C < 7.5 and within past 6 months    In person appointment or virtual visit in the past 6 mos or appointment in next 3 mos    Microalbumin procedure in past 12 months or taking ACE/ARB    EGFRCR or GFRAA > 50    GFR in the past 12 months        LOV 6/26/24 with    Talya Snyder APRN     Future Appointments   Date Time Provider Department Center   8/9/2024  3:45 PM Luz Jones, PT [facility] [place]   8/13/2024  3:45 PM Lois Rodriguez, PT [facility] [place]   8/15/2024  3:00 PM Lois Rodriguez, PT [facility] [place]   Labs  6/26/24 A1C 6.6

## 2024-08-13 ENCOUNTER — OFFICE VISIT (OUTPATIENT)
Dept: PHYSICAL THERAPY | Age: 54
End: 2024-08-13
Attending: FAMILY MEDICINE
Payer: COMMERCIAL

## 2024-08-13 PROCEDURE — 97110 THERAPEUTIC EXERCISES: CPT

## 2024-08-13 NOTE — PROGRESS NOTES
Diagnosis:   Sprain of medial collateral ligament of left knee, initial encounter (S83.412A)  Primary osteoarthritis of left knee (M17.12)      Referring Provider: Destin Antonio  Date of Evaluation:     7/23/2024    Precautions:   diabetes and HTN Next MD visit:   none scheduled  Date of Surgery: n/a   Insurance Primary/Secondary: [employer] [place] / N/A     # Auth Visits: 6 POC            Subjective: Patient report L knee is doing well. R knee sore after goal. Calf is more flexibility. Note some R Achilles irritation as well after golf.     Pain: 1-2/10      Objective:   8/13/2024   Increase hip extension greater than 10 degrees.  Progress HEP to functional training and gluteal strengthening  8/9/2024  Normal Hamstring flexibility on both sides after stretching.   Grade 1 tib fib AP and lateral. Grade 2 at end of session      Assessment: Patient achieved 2 out of 6 goals. He is progressing toward remaining goals and progressing to functional training. Golf seems to continue to be an aggravating factor for knee.       Goals:   (to be met in 6 visits)    Patient will demonstrate independence in performing home exercise program.  Patient will demonstrate full hip ER to reduce stress at knee with functional rotational movements.   Patient will demonstrate increase hip extension to 10 degrees to reduce knee strain during gait with push off phase of gait.  MET  Patient will report 0-1/10 pain in morning majority of mornings during the week.   Patient will demonstrate hamstring flexibility WNL  Patient will demonstrate 10 DF with knee extension to improve shock absorption of knee during gait. MET    Plan: Continue PT to restore joint mobility, LE stretching and gluteal strengthening. Add hip ER.  Date: 8/6/2024  TX#: 2/6 Date: 8/9/24                TX#: 3/6 Date:    8/13/2024              TX#: 4/6 Date:                 TX#: 5/ Date:   Tx#: 6/   TE 35 min  Nu Step WL 5, 5 min  Hamstring stretch 30 sec  R x 4  L x 4  Hip flexor  30 sec   R x 4  L x 4  Quad stretch   R 30 sec x 5  L 30 sec x 5   Therapeutic Exercises:  35 mins  Upright Bike  6 mins  Slant Board Stretch  3x30 secs  Supine Hip Flexor Stretch  3x30 secs  Quad Sets  20x5 secs  Ball Squeezes  20x5 secs  Hip abd w/ Ring  20x5 secs  Mini squat @ Table  3x10 TE 45 min  Upright Level 4, 6 min  Slant board 30 sec x 5  Hip flexor stretch 30 sec x 4 on both  Shuttle  DLP BBG 3 x 15  SLP BB 2 x 15 on both    Standing hip: GTB  Abd  2 x 10 on both sides  Ext  2 x 10 on both sides  Side stepping 5 laps 8 feet  Backward seated squat with arm raises x 10  Instruct HEP issue band   Hip ER    Issue HO in [employer] becki         MT: 10 min  left  Quad strumming and muscle play Manual Therapy:  10 mins  IASTM to L inf patella/patellar tendon and manual rolling to the L ITB      GIV PF lateral to medial  Tib fem   AP GIV  AP medial and lateral GIV       HEP:   .Access Code: [email or web address]  URL: [email or web address]  Date: 07/23/2024  Prepared by: Lois Rodriguez    Exercises  - Supine Figure 4 Piriformis Stretch  - 2 x daily - 7 x weekly - 1 sets - 4 reps - 30 hold  - Supine Lumbar Rotation Stretch  - 2 x daily - 7 x weekly - 1 sets - 4 reps - 30 hold    Charges: TE 3     Total Timed Treatment: 45 min  Total Treatment Time: 45 min    LOWER EXTREMITY EVALUATION:          Diagnosis:    Sprain of medial collateral ligament of left knee, initial encounter (S83.412A)  Primary osteoarthritis of left knee (M17.12)   Referring Provider: Destin Antonio  Date of Evaluation:    7/23/2024    Precautions:   diabetes and HTN Next MD visit:   none scheduled  Date of Surgery: n/a     PATIENT SUMMARY   Russ Padilla Jr. is a 53 year old male who presents to therapy today with complaints of L knee pain. In June, slipped playing golf.  Pain is worse with rolling and turning. Pain 2 days after goal.   Also experiencing L Achilles tendonitis and seeing foot doctor.    Pt describes pain level current 0-1/10,  at best 0/10, at worst 3/10.  With the first injury 9-10  Worse :  rolling in bed, wake up with pain, day after golf, sit to stand if chair to low  Better : Motrin helps, adjust chair    Xray L knee 9/2023: No evidence of an acute process.   2. Mild sharpening of the tibial spine suspicious for underlying chondromalacia versus early osteoarthritis.   3. Mild extensor enthesopathy.     X ray R knee 9/2023: 1. No acute process suggested.  Mild sharpening of the tibial spines, suspicious for chondromalacia versus early osteoarthritis.   2. Stable extensor enthesopathy.   3. Mild suprapatellar joint effusion.     Current functional limitations include rolling, transfer out of bed in morning, rotational movements with golf, sit to stand from low chair surface.      Job: tech job  Hobbies: golf, motor cycling  Russ describes prior level of function no knee pain after golfing. Pt goals include less pain in morning after days after golf.    Past medical history was reviewed with Russ. Significant findings include   has a past medical history of Diabetes (HCC) and Essential hypertension.      ASSESSMENT  Russ presents to physical therapy evaluation with primary c/o L knee pain. The results of the objective tests and measures show tightness in hip ER, hip flexor, quad, hamstring and calf muscles. He presents with weakness in gluteals. He has restricted eversion in talocrural joint. Patient presents with mild over pronation. Patient has normal strength of ligamentous structures of knee and negative meniscal findings. Patient is alternates from settled stage of condition to subacute stage of condition after golfing. May limitation is restrictions at hips that could be provoking knee during golf.  Functional deficits include but are not limited to rolling, transfer out of bed in morning, rotational movements with golf, sit to stand from low chair surface.  Signs and symptoms are consistent with diagnosis of Sprain of medial  collateral ligament of left knee, initial encounter (S83.412A) and Primary osteoarthritis of left knee (M17.12). Pt and PT discussed evaluation findings, pathology, POC and HEP.  Pt voiced understanding and performs HEP correctly without reported pain. Skilled Physical Therapy is medically necessary to address the above impairments and reach functional goals. Patient will benefit from therapy to receive manual therapy for joint and soft tissue manipulation; neuromuscular re-education for hip and ankle coordination and therapeutic exercise for hip stretching/gluteal strengthening/ LE stretches and TC joint manipulation. Patient has appropriate foot wear at this time.     Patient is traveling next week and will return to PT first week of August.        OBJECTIVE:   Observation: mild pronation bilaterall  Palpation: tender medial joint L   Sensation: LT intact    AROM: (* denotes performed with pain)  Hip Knee Foot/Ankle   Flexion: R wnl; L wnl  Extension: R 5; L 5  Abduction: R wnl; L wnl  ER: R 50%; L 50%  IR: R wnl; L wnl Flexion: R wnl; L wnl with pinch at end range   Extension: R 0; L 0    WNL except  DF: R 5; L 5       Accessory motion: Grade 1 restriction of TC eversion     Flexibility:  Hip Flexor: R 5, L 5  Hamstrings: R 65 hip flex with knee straight ; L 65 hip flex with knee straight  Piriformis: R restricted ; L restricted   Quads: R 95; L 95  Gastroc-soleus: R5; L 5    Strength/MMT: (* denotes performed with pain)  Hip Knee Foot/Ankle   Flexion: R 5/5; L 5/5  Extension: R 4-/5; L 4-/5  Abduction: R 4-/5; L 4-/5  ER: R 4/5; L 4/5  IR: R 5/5; L 5/5 Flexion: R 5/5; L 5/5  Extension: R 5/5; L 5/5    DF: R 5/5; L 5/5  PF: R 5/5; L 5/5       Special tests:   Negative B varus and valgus test, negative Carmen test    Gait: pt ambulates on level ground with normal  Balance: not tested at this time     Today’s Treatment and Response:   Pt education was provided on exam findings, treatment diagnosis, treatment  plan, expectations, and prognosis. Pt was also provided recommendations for possible soreness after evaluation and edema management, hip mechanics affect on knee .  Patient was instructed in and issued a HEP for:   Access Code: [email or web address]  URL: [email or web address]  Date: 07/23/2024  Prepared by: Lios Rodriguez    Exercises  - Supine Figure 4 Piriformis Stretch  - 2 x daily - 7 x weekly - 1 sets - 4 reps - 30 hold  - Supine Lumbar Rotation Stretch  - 2 x daily - 7 x weekly - 1 sets - 4 reps - 30 hold    Charges: PT Eval Low Complexity, Ex      Total Timed Treatment: 45 min     Total Treatment Time: 45 min     Goals: (to be met in 6 visits)    Patient will demonstrate independence in performing home exercise program.  Patient will demonstrate full hip ER to reduce stress at knee with functional rotational movements.   Patient will demonstrate increase hip extension to 10 degrees to reduce knee strain during gait with push off phase of gait.   Patient will report 0-1/10 pain in morning majority of mornings during the week.   Patient will demonstrate hamstring flexibility WNL  Patient will demonstrate 10 DF with knee extension to improve shock absorption of knee during gait.     Frequency / Duration: Patient will be seen for 2x/week or a total of 6 visits over a 90 day period. Treatment will include: Manual Therapy, Neuromuscular Re-education, Therapeutic Exercise, and Home Exercise Program instruction    Education or treatment limitation: None  Rehab Potential:good    LEFS Score  LEFS Score: 95 % (7/23/2024  1:38 PM)      Patient/Family/Caregiver was advised of these findings, precautions, and treatment options and has agreed to actively participate in planning and for this course of care.    Thank you for your referral. Please co-sign or sign and return this letter via fax as soon as possible to [phone]. If you have any questions, please contact me at Dept:  [phone]    Sincerely,  Electronically signed by therapist: Lois Rodriguez, PT  Physician's certification required: Yes  I certify the need for these services furnished under this plan of treatment and while under my care.    X___________________________________________________ Date____________________    Certification From: 7/23/2024  To:10/21/2024

## 2024-08-15 ENCOUNTER — OFFICE VISIT (OUTPATIENT)
Dept: PHYSICAL THERAPY | Age: 54
End: 2024-08-15
Attending: FAMILY MEDICINE
Payer: COMMERCIAL

## 2024-08-15 PROCEDURE — 97140 MANUAL THERAPY 1/> REGIONS: CPT

## 2024-08-15 PROCEDURE — 97110 THERAPEUTIC EXERCISES: CPT

## 2024-08-15 NOTE — PROGRESS NOTES
Diagnosis:   Sprain of medial collateral ligament of left knee, initial encounter (S83.412A)  Primary osteoarthritis of left knee (M17.12)      Referring Provider: Destin Antonio  Date of Evaluation:     7/23/2024    Precautions:   diabetes and HTN Next MD visit:   none scheduled  Date of Surgery: n/a   Insurance Primary/Secondary: [employer] / N/A     # Auth Visits: 6 POC            Subjective: Patient report L knee is still doing better. R knee is waking me at night but not every night.  Noticing some aching during the day similar to year's past but not as high or consistent. Patient feels overall he is much better with less pain.    Pain: 0/10 in both knees. R knee at night 3/10 sometimes.      Objective:   8/15/2024   Hip ER normal after manual therapy improved remaining 10 degres  Hip extension WNL after stretching today.   Normal tib fib mobility after mobilization on both sides.    8/13/2024   Increase hip extension greater than 10 degrees.  Progress HEP to functional training and gluteal strengthening  8/9/2024  Normal Hamstring flexibility on both sides after stretching.   Grade 1 tib fib AP and lateral. Grade 2 at end of session      Assessment: Patient achieved 3 out of 6 goals. He is progressing toward remaining goals and progressing to functional training. Golf seems to continue to be an aggravating factor for knee but better.       Goals:   (to be met in 6 visits)    Patient will demonstrate independence in performing home exercise program.  Patient will demonstrate full hip ER to reduce stress at knee with functional rotational movements.  MET  Patient will demonstrate increase hip extension to 10 degrees to reduce knee strain during gait with push off phase of gait.  MET  Patient will report 0-1/10 pain in morning majority of mornings during the week.   Patient will demonstrate hamstring flexibility WNL  Patient will demonstrate 10 DF with knee extension to improve shock absorption of knee during  gait. MET    Plan: Continue PT to restore joint mobility, LE stretching and gluteal strengthening. Add hip ER.  Date: 8/6/2024  TX#: 2/6 Date: 8/9/24                TX#: 3/6 Date:    8/13/2024              TX#: 4/6 Date:  8/15/2024                   TX#: 5/6 Date:   Tx#: 6/   TE 35 min  Nu Step WL 5, 5 min  Hamstring stretch 30 sec  R x 4  L x 4  Hip flexor 30 sec   R x 4  L x 4  Quad stretch   R 30 sec x 5  L 30 sec x 5   Therapeutic Exercises:  35 mins  Upright Bike  6 mins  Slant Board Stretch  3x30 secs  Supine Hip Flexor Stretch  3x30 secs  Quad Sets  20x5 secs  Ball Squeezes  20x5 secs  Hip abd w/ Ring  20x5 secs  Mini squat @ Table  3x10 TE 45 min  Upright Level 4, 6 min  Slant board 30 sec x 5  Hip flexor stretch 30 sec x 4 on both  Shuttle  DLP BBG 3 x 15  SLP BB 2 x 15 on both    Standing hip: GTB  Abd  2 x 10 on both sides  Ext  2 x 10 on both sides  Side stepping 5 laps 8 feet  Backward seated squat with arm raises x 10  Instruct HEP issue band   TE 35 min  Up right level 4, 6 min  Slant board elevated 30 sec x 4  Eccentric lower with calves 2 x 10  Hip Er stretch manual 30 sec x 4  Shuttle DLP BTB BBGY  3 x 20  SLP BBG 2 x 10    MT: 10 min  GIV PF lateral to medial  Tib fem   AP GIV  AP medial and lateral GIV      [facility]         MT: 10 min  left  Quad strumming and muscle play Manual Therapy:  10 mins  IASTM to L inf patella/patellar tendon and manual rolling to the L ITB      GIV PF lateral to medial  Tib fem   AP GIV  AP medial and lateral GIV       HEP:   .Access Code: [email or web address]  URL: [email or web address]  Date: 07/23/2024  Prepared by: Lois Rodriguez    Exercises  - Supine Figure 4 Piriformis Stretch  - 2 x daily - 7 x weekly - 1 sets - 4 reps - 30 hold  - Supine Lumbar Rotation Stretch  - 2 x daily - 7 x weekly - 1 sets - 4 reps - 30 hold    Charges: TE 2 MT 1   Total Timed Treatment: 45 min  Total Treatment Time: 45 min    LOWER EXTREMITY EVALUATION:           Diagnosis:    Sprain of medial collateral ligament of left knee, initial encounter (S83.412A)  Primary osteoarthritis of left knee (M17.12)   Referring Provider: Destin Antonio  Date of Evaluation:    7/23/2024    Precautions:   diabetes and HTN Next MD visit:   none scheduled  Date of Surgery: n/a     PATIENT SUMMARY   Russ Padilla Jr. is a 53 year old male who presents to therapy today with complaints of L knee pain. In June, slipped playing golf.  Pain is worse with rolling and turning. Pain 2 days after goal.   Also experiencing L Achilles tendonitis and seeing foot doctor.    Pt describes pain level current 0-1/10, at best 0/10, at worst 3/10.  With the first injury 9-10  Worse :  rolling in bed, wake up with pain, day after golf, sit to stand if chair to low  Better : Motrin helps, adjust chair    Xray L knee 9/2023: No evidence of an acute process.   2. Mild sharpening of the tibial spine suspicious for underlying chondromalacia versus early osteoarthritis.   3. Mild extensor enthesopathy.     X ray R knee 9/2023: 1. No acute process suggested.  Mild sharpening of the tibial spines, suspicious for chondromalacia versus early osteoarthritis.   2. Stable extensor enthesopathy.   3. Mild suprapatellar joint effusion.     Current functional limitations include rolling, transfer out of bed in morning, rotational movements with golf, sit to stand from low chair surface.      Job: tech job  Hobbies: golf, motor cycling  Russ describes prior level of function no knee pain after golfing. Pt goals include less pain in morning after days after golf.    Past medical history was reviewed with Russ. Significant findings include   has a past medical history of Diabetes ([facility]) and Essential hypertension.      ASSESSMENT  Russ presents to physical therapy evaluation with primary c/o L knee pain. The results of the objective tests and measures show tightness in hip ER, hip flexor, quad, hamstring and calf muscles. He  presents with weakness in gluteals. He has restricted eversion in talocrural joint. Patient presents with mild over pronation. Patient has normal strength of ligamentous structures of knee and negative meniscal findings. Patient is alternates from settled stage of condition to subacute stage of condition after golfing. May limitation is restrictions at hips that could be provoking knee during golf.  Functional deficits include but are not limited to rolling, transfer out of bed in morning, rotational movements with golf, sit to stand from low chair surface.  Signs and symptoms are consistent with diagnosis of Sprain of medial collateral ligament of left knee, initial encounter (S83.412A) and Primary osteoarthritis of left knee (M17.12). Pt and PT discussed evaluation findings, pathology, POC and HEP.  Pt voiced understanding and performs HEP correctly without reported pain. Skilled Physical Therapy is medically necessary to address the above impairments and reach functional goals. Patient will benefit from therapy to receive manual therapy for joint and soft tissue manipulation; neuromuscular re-education for hip and ankle coordination and therapeutic exercise for hip stretching/gluteal strengthening/ LE stretches and TC joint manipulation. Patient has appropriate foot wear at this time.     Patient is traveling next week and will return to PT first week of August.        OBJECTIVE:   Observation: mild pronation bilaterall  Palpation: tender medial joint L   Sensation: LT intact    AROM: (* denotes performed with pain)  Hip Knee Foot/Ankle   Flexion: R wnl; L wnl  Extension: R 5; L 5  Abduction: R wnl; L wnl  ER: R 50%; L 50%  IR: R wnl; L wnl Flexion: R wnl; L wnl with pinch at end range   Extension: R 0; L 0    WNL except  DF: R 5; L 5       Accessory motion: Grade 1 restriction of TC eversion     Flexibility:  Hip Flexor: R 5, L 5  Hamstrings: R 65 hip flex with knee straight ; L 65 hip flex with knee  straight  Piriformis: R restricted ; L restricted   Quads: R 95; L 95  Gastroc-soleus: R5; L 5    Strength/MMT: (* denotes performed with pain)  Hip Knee Foot/Ankle   Flexion: R 5/5; L 5/5  Extension: R 4-/5; L 4-/5  Abduction: R 4-/5; L 4-/5  ER: R 4/5; L 4/5  IR: R 5/5; L 5/5 Flexion: R 5/5; L 5/5  Extension: R 5/5; L 5/5    DF: R 5/5; L 5/5  PF: R 5/5; L 5/5       Special tests:   Negative B varus and valgus test, negative Carmen test    Gait: pt ambulates on level ground with normal  Balance: not tested at this time     Today’s Treatment and Response:   Pt education was provided on exam findings, treatment diagnosis, treatment plan, expectations, and prognosis. Pt was also provided recommendations for possible soreness after evaluation and edema management, hip mechanics affect on knee .  Patient was instructed in and issued a HEP for:   Access Code: [email or web address]  URL: [email or web address]  Date: 07/23/2024  Prepared by: Lois Rodriguez    Exercises  - Supine Figure 4 Piriformis Stretch  - 2 x daily - 7 x weekly - 1 sets - 4 reps - 30 hold  - Supine Lumbar Rotation Stretch  - 2 x daily - 7 x weekly - 1 sets - 4 reps - 30 hold    Charges: PT Eval Low Complexity, Ex      Total Timed Treatment: 45 min     Total Treatment Time: 45 min     Goals: (to be met in 6 visits)    Patient will demonstrate independence in performing home exercise program.  Patient will demonstrate full hip ER to reduce stress at knee with functional rotational movements.   Patient will demonstrate increase hip extension to 10 degrees to reduce knee strain during gait with push off phase of gait.   Patient will report 0-1/10 pain in morning majority of mornings during the week.   Patient will demonstrate hamstring flexibility WNL  Patient will demonstrate 10 DF with knee extension to improve shock absorption of knee during gait.     Frequency / Duration: Patient will be seen for 2x/week or a total of 6 visits over a 90 day period.  Treatment will include: Manual Therapy, Neuromuscular Re-education, Therapeutic Exercise, and Home Exercise Program instruction    Education or treatment limitation: None  Rehab Potential:good    LEFS Score  LEFS Score: 95 % (7/23/2024  1:38 PM)      Patient/Family/Caregiver was advised of these findings, precautions, and treatment options and has agreed to actively participate in planning and for this course of care.    Thank you for your referral. Please co-sign or sign and return this letter via fax as soon as possible to [phone]. If you have any questions, please contact me at Dept: [phone]    Sincerely,  Electronically signed by therapist: Lois Rodriguez, PT  Physician's certification required: Yes  I certify the need for these services furnished under this plan of treatment and while under my care.    X___________________________________________________ Date____________________    Certification From: 7/23/2024  To:10/21/2024

## 2024-08-22 DIAGNOSIS — Z79.4 TYPE 2 DIABETES MELLITUS WITHOUT COMPLICATION, WITH LONG-TERM CURRENT USE OF INSULIN (HCC): ICD-10-CM

## 2024-08-22 DIAGNOSIS — E11.9 TYPE 2 DIABETES MELLITUS WITHOUT COMPLICATION, WITH LONG-TERM CURRENT USE OF INSULIN (HCC): ICD-10-CM

## 2024-08-23 RX ORDER — INSULIN GLARGINE 100 [IU]/ML
12 INJECTION, SOLUTION SUBCUTANEOUS NIGHTLY
Qty: 15 ML | Refills: 0 | Status: SHIPPED | OUTPATIENT
Start: 2024-08-23

## 2024-08-23 NOTE — TELEPHONE ENCOUNTER
Diabetes Medication Protocol Passed   Last office visit 6//26/24  Last refilled on 5/9/24 for # 15ml with 0 refills  Future Appointments   Date Time Provider Department Center   9/10/2024  3:00 PM Lois Rodriguez, PT PF [facility]   9/12/2024  2:15 PM Lois Rodriguez, PT PF [facility] [place]   10/8/2024  1:30 PM Lois Rodriguez, PT PF [facility]        Thank you.

## 2024-08-28 ENCOUNTER — TELEPHONE (OUTPATIENT)
Dept: PHYSICAL THERAPY | Facility: HOSPITAL | Age: 54
End: 2024-08-28

## 2024-08-30 RX ORDER — FLUTICASONE PROPIONATE 50 MCG
2 SPRAY, SUSPENSION (ML) NASAL DAILY
Qty: 16 G | Refills: 0 | Status: SHIPPED | OUTPATIENT
Start: 2024-08-30

## 2024-08-30 NOTE — TELEPHONE ENCOUNTER
LOV: 6/26/24 for type 2 diabetes      FLUTICASONE PROPIONATE 50 MCG/ACT Nasal Suspension  USE 2 SPRAYS NASALLY DAILY. Dispense: 16 g, Refills: 0 ordered        08/07/2024         Allergy Medication Protocol [id number]/30/2024 07:07 AM   Protocol Details In person appointment or virtual visit in the past 12 mos or appointment in next 3 mos        Future Appointments   Date Time Provider Department Center   9/10/2024  3:00 PM Lois Rodriguez PT PF [facility]   9/12/2024  2:15 PM Lois Rodriguez PT PF [facility]   10/8/2024  1:30 PM Lois Rodriguez, PT [facility]

## 2024-09-09 RX ORDER — LISINOPRIL AND HYDROCHLOROTHIAZIDE 20; 25 MG/1; MG/1
1 TABLET ORAL DAILY
Qty: 90 TABLET | Refills: 0 | Status: SHIPPED | OUTPATIENT
Start: 2024-09-09

## 2024-09-09 NOTE — TELEPHONE ENCOUNTER
Hypertension Medications Protocol [id number]/08/2024 02:28 AM   Protocol Details CMP or BMP in past 12 months      Last refill 8/9/24 30 0 refill

## 2024-09-10 ENCOUNTER — OFFICE VISIT (OUTPATIENT)
Dept: PHYSICAL THERAPY | Age: 54
End: 2024-09-10
Attending: FAMILY MEDICINE
Payer: COMMERCIAL

## 2024-09-10 PROCEDURE — 97140 MANUAL THERAPY 1/> REGIONS: CPT

## 2024-09-10 PROCEDURE — 97110 THERAPEUTIC EXERCISES: CPT

## 2024-09-10 NOTE — PROGRESS NOTES
Discharge Summary        Diagnosis:   Sprain of medial collateral ligament of left knee, initial encounter (S83.412A)  Primary osteoarthritis of left knee (M17.12)      Referring Provider: Destin Antonio  Date of Evaluation:     7/23/2024    Precautions:   diabetes and HTN Next MD visit:   none scheduled  Date of Surgery: n/a   Insurance Primary/Secondary: [employer] / N/A     # Auth Visits: 6 POC            Pt has attended 6, cancelled 0, and no shown 0 visits in Physical Therapy.     Subjective: Patient report both knees are doing better. Golfed two games in a row and no post game pain the next day. Some slight hamstring tightness behind the knee on right side. Patient feels he is ready to be discharged and continue with stretches.     Pain: 0/10 in both knees      Objective:   Hip ER wnl  Hip extension 10 degrees with tightness at end range  Grade 1 tib fib AP and lateral. Grade 2 at end of session  Hamstring -10 at 80 hip flex  DF increase 10 degrees    Post LEFS Score  Post LEFS Score: 100 % (9/10/2024  3:22 PM)    5 % improvement     Assessment: Patient achieved 6 out of 6 goals. Patient improved LEFS to 100%. Patient to continue with HEP and focus on stretches. Patient made the most progress with joint manipulation of tib and fib. Patient has returned to sport without post game play pain and stiffness.       Goals:   (to be met in 6 visits)    Patient will demonstrate independence in performing home exercise program. MET  Patient will demonstrate full hip ER to reduce stress at knee with functional rotational movements.  MET  Patient will demonstrate increase hip extension to 10 degrees to reduce knee strain during gait with push off phase of gait.  MET  Patient will report 0-1/10 pain in morning majority of mornings during the week.  MET  Patient will demonstrate hamstring flexibility WNL MET  Patient will demonstrate 10 DF with knee extension to improve shock absorption of knee during gait. MET    Plan:  Discharge to continue with HEP particularly stretches long term with desk activity causing shortening of hamstring and hip flexor    Patient/Family/Caregiver was advised of these findings, precautions, and treatment options and has agreed to actively participate in planning and for this course of care.    Thank you for your referral. If you have any questions, please contact me at Dept: [phone].    Sincerely,  Electronically signed by therapist: Lois Rodriguez PT    Physician's certification required: Yes  Please co-sign or sign and return this letter via fax as soon as possible to [phone].   I certify the need for these services furnished under this plan of treatment and while under my care.    X___________________________________________________ Date____________________    Certification From: 9/10/2024  To:12/9/2024        Date: 8/6/2024  TX#: 2/6 Date: 8/9/24                TX#: 3/6 Date:    8/13/2024              TX#: 4/6 Date:  8/15/2024                   TX#: 5/6 Date: 9/10/2024   Tx#: 6/6   TE 35 min  Nu Step WL 5, 5 min  Hamstring stretch 30 sec  R x 4  L x 4  Hip flexor 30 sec   R x 4  L x 4  Quad stretch   R 30 sec x 5  L 30 sec x 5   Therapeutic Exercises:  35 mins  Upright Bike  6 mins  Slant Board Stretch  3x30 secs  Supine Hip Flexor Stretch  3x30 secs  Quad Sets  20x5 secs  Ball Squeezes  20x5 secs  Hip abd w/ Ring  20x5 secs  Mini squat @ Table  3x10 TE 45 min  Upright Level 4, 6 min  Slant board 30 sec x 5  Hip flexor stretch 30 sec x 4 on both  Shuttle  DLP BBG 3 x 15  SLP BB 2 x 15 on both    Standing hip: GTB  Abd  2 x 10 on both sides  Ext  2 x 10 on both sides  Side stepping 5 laps 8 feet  Backward seated squat with arm raises x 10  Instruct HEP issue band   TE 35 min  Up right level 4, 6 min  Slant board elevated 30 sec x 4  Eccentric lower with calves 2 x 10  Hip Er stretch manual 30 sec x 4  Shuttle DLP BTB BBGY  3 x 20  SLP BBG 2 x 10    MT: 10 min  GIV PF lateral to medial  Tib  fem   AP GIV  AP medial and lateral GIV   TE 35 min  Up right level 4, 6 min  Slant board elevated 30 sec x 4  Hamstring stretch 30 sec x 3 on both sides  Re-evaluation  Hip flexor stretch 30 sec x 4 on each side  Review HEP  Issue final HEP including hip flexor stretch modification for knee joint protection.  MT: 10 min  GIV PF lateral to medial  Tib fem   AP GIV  AP medial and lateral GIV   Issue HO in [email or web address] becki         MT: 10 min  left  Quad strumming and muscle play Manual Therapy:  10 mins  IASTM to L inf patella/patellar tendon and manual rolling to the L ITB      GIV PF lateral to medial  Tib fem   AP GIV  AP medial and lateral GIV       HEP:   .Access [email or web address]: [email or web address]  URL: [email or web address]  Date: 07/23/2024  Prepared by: Lois Rodriguez    Exercises  - Supine Figure 4 Piriformis Stretch  - 2 x daily - 7 x weekly - 1 sets - 4 reps - 30 hold  - Supine Lumbar Rotation Stretch  - 2 x daily - 7 x weekly - 1 sets - 4 reps - 30 hold  9/10/2024   - Standing Hip Flexor Stretch on Chair  - 2 x daily - 7 x weekly - 2 sets - 5 reps - 30 hold    Charges: TE 2 MT 1   Total Timed Treatment: 45 min  Total Treatment Time: 45 min    LOWER EXTREMITY EVALUATION:          Diagnosis:    Sprain of medial collateral ligament of left knee, initial encounter (S83.412A)  Primary osteoarthritis of left knee (M17.12)   Referring Provider: Destin Antonio  Date of Evaluation:    7/23/2024    Precautions:   diabetes and HTN Next MD visit:   none scheduled  Date of Surgery: n/a     PATIENT SUMMARY   Russ Padilla . is a 53 year old male who presents to therapy today with complaints of L knee pain. In June, slipped playing golf.  Pain is worse with rolling and turning. Pain 2 days after goal.   Also experiencing L Achilles tendonitis and seeing foot doctor.    Pt describes pain level current 0-1/10, at best 0/10, at worst 3/10.  With the first injury 9-10  Worse :  rolling in bed, wake up with pain, day after golf,  sit to stand if chair to low  Better : Motrin helps, adjust chair    Xray L knee 9/2023: No evidence of an acute process.   2. Mild sharpening of the tibial spine suspicious for underlying chondromalacia versus early osteoarthritis.   3. Mild extensor enthesopathy.     X ray R knee 9/2023: 1. No acute process suggested.  Mild sharpening of the tibial spines, suspicious for chondromalacia versus early osteoarthritis.   2. Stable extensor enthesopathy.   3. Mild suprapatellar joint effusion.     Current functional limitations include rolling, transfer out of bed in morning, rotational movements with golf, sit to stand from low chair surface.      Job: tech job  Hobbies: golf, motor cycling  Russ describes prior level of function no knee pain after golfing. Pt goals include less pain in morning after days after golf.    Past medical history was reviewed with Russ. Significant findings include   has a past medical history of Diabetes (HCC) and Essential hypertension.      ASSESSMENT  Russ presents to physical therapy evaluation with primary c/o L knee pain. The results of the objective tests and measures show tightness in hip ER, hip flexor, quad, hamstring and calf muscles. He presents with weakness in gluteals. He has restricted eversion in talocrural joint. Patient presents with mild over pronation. Patient has normal strength of ligamentous structures of knee and negative meniscal findings. Patient is alternates from settled stage of condition to subacute stage of condition after golfing. May limitation is restrictions at hips that could be provoking knee during golf.  Functional deficits include but are not limited to rolling, transfer out of bed in morning, rotational movements with golf, sit to stand from low chair surface.  Signs and symptoms are consistent with diagnosis of Sprain of medial collateral ligament of left knee, initial encounter (S83.412A) and Primary osteoarthritis of left knee (M17.12). Pt and  PT discussed evaluation findings, pathology, POC and HEP.  Pt voiced understanding and performs HEP correctly without reported pain. Skilled Physical Therapy is medically necessary to address the above impairments and reach functional goals. Patient will benefit from therapy to receive manual therapy for joint and soft tissue manipulation; neuromuscular re-education for hip and ankle coordination and therapeutic exercise for hip stretching/gluteal strengthening/ LE stretches and TC joint manipulation. Patient has appropriate foot wear at this time.     Patient is traveling next week and will return to PT first week of August.        OBJECTIVE:   Observation: mild pronation bilaterall  Palpation: tender medial joint L   Sensation: LT intact    AROM: (* denotes performed with pain)  Hip Knee Foot/Ankle   Flexion: R wnl; L wnl  Extension: R 5; L 5  Abduction: R wnl; L wnl  ER: R 50%; L 50%  IR: R wnl; L wnl Flexion: R wnl; L wnl with pinch at end range   Extension: R 0; L 0    WNL except  DF: R 5; L 5       Accessory motion: Grade 1 restriction of TC eversion     Flexibility:  Hip Flexor: R 5, L 5  Hamstrings: R 65 hip flex with knee straight ; L 65 hip flex with knee straight  Piriformis: R restricted ; L restricted   Quads: R 95; L 95  Gastroc-soleus: R5; L 5    Strength/MMT: (* denotes performed with pain)  Hip Knee Foot/Ankle   Flexion: R 5/5; L 5/5  Extension: R 4-/5; L 4-/5  Abduction: R 4-/5; L 4-/5  ER: R 4/5; L 4/5  IR: R 5/5; L 5/5 Flexion: R 5/5; L 5/5  Extension: R 5/5; L 5/5    DF: R 5/5; L 5/5  PF: R 5/5; L 5/5       Special tests:   Negative B varus and valgus test, negative Carmen test    Gait: pt ambulates on level ground with normal  Balance: not tested at this time     Today’s Treatment and Response:   Pt education was provided on exam findings, treatment diagnosis, treatment plan, expectations, and prognosis. Pt was also provided recommendations for possible soreness after evaluation and edema  management, hip mechanics affect on knee .  Patient was instructed in and issued a HEP for:   Access Code: [email or web address]  URL: [email or web address]  Date: 07/23/2024  Prepared by: Lois Rodriguez    Exercises  - Supine Figure 4 Piriformis Stretch  - 2 x daily - 7 x weekly - 1 sets - 4 reps - 30 hold  - Supine Lumbar Rotation Stretch  - 2 x daily - 7 x weekly - 1 sets - 4 reps - 30 hold    Charges: PT Eval Low Complexity, Ex      Total Timed Treatment: 45 min     Total Treatment Time: 45 min     Goals: (to be met in 6 visits)    Patient will demonstrate independence in performing home exercise program.  Patient will demonstrate full hip ER to reduce stress at knee with functional rotational movements.   Patient will demonstrate increase hip extension to 10 degrees to reduce knee strain during gait with push off phase of gait.   Patient will report 0-1/10 pain in morning majority of mornings during the week.   Patient will demonstrate hamstring flexibility WNL  Patient will demonstrate 10 DF with knee extension to improve shock absorption of knee during gait.     Frequency / Duration: Patient will be seen for 2x/week or a total of 6 visits over a 90 day period. Treatment will include: Manual Therapy, Neuromuscular Re-education, Therapeutic Exercise, and Home Exercise Program instruction    Education or treatment limitation: None  Rehab Potential:good    LEFS Score  LEFS Score: 95 % (7/23/2024  1:38 PM)      Patient/Family/Caregiver was advised of these findings, precautions, and treatment options and has agreed to actively participate in planning and for this course of care.    Thank you for your referral. Please co-sign or sign and return this letter via fax as soon as possible to [phone]. If you have any questions, please contact me at Dept: [phone]    Sincerely,  Electronically signed by therapist: Lois Rodriguez, PT  Physician's certification required: Yes  I certify the need for these services  furnished under this plan of treatment and while under my care.    X___________________________________________________ Date____________________    Certification From: 7/23/2024  To:10/21/2024

## 2024-09-12 ENCOUNTER — APPOINTMENT (OUTPATIENT)
Dept: PHYSICAL THERAPY | Age: 54
End: 2024-09-12
Attending: FAMILY MEDICINE
Payer: COMMERCIAL

## 2024-09-24 RX ORDER — FLUTICASONE PROPIONATE 50 MCG
2 SPRAY, SUSPENSION (ML) NASAL DAILY
Qty: 16 G | Refills: 0 | Status: SHIPPED | OUTPATIENT
Start: 2024-09-24

## 2024-09-24 NOTE — TELEPHONE ENCOUNTER
Allergy Medication Protocol [id number]/24/2024 01:13 AM   Protocol Details In person appointment or virtual visit in the past 12 mos or appointment in next 3 mos         Request for  FLUTICASONE PROPIONATE 50 MCG/ACT Nasal Suspension     LOV 6/26/24 with Talya Snyder APRN   Last refill 8/30/24  - 16g 0 refills   No future appointments.    Labs 6/26/24

## 2024-10-08 ENCOUNTER — APPOINTMENT (OUTPATIENT)
Dept: PHYSICAL THERAPY | Age: 54
End: 2024-10-08
Attending: FAMILY MEDICINE
Payer: COMMERCIAL

## 2024-10-24 RX ORDER — FLUTICASONE PROPIONATE 50 MCG
2 SPRAY, SUSPENSION (ML) NASAL DAILY
Qty: 16 G | Refills: 0 | Status: SHIPPED | OUTPATIENT
Start: 2024-10-24

## 2024-10-24 NOTE — TELEPHONE ENCOUNTER
Fax from [facility] requesting refill  Fluticasone nasal spray last refilled 9/24/24  Future appointment with  12/9/24  LOV with Ammy 6/26/24      Allergy Medication Protocol Emogqp54/24/2024 09:35 AM   Protocol Details In person appointment or virtual visit in the past 12 mos or appointment in next 3 mos         Hydroxyzine Counseling: Patient advised that the medication is sedating and not to drive a car after taking this medication.  Patient informed of potential adverse effects including but not limited to dry mouth, urinary retention, and blurry vision.  The patient verbalized understanding of the proper use and possible adverse effects of hydroxyzine.  All of the patient's questions and concerns were addressed.

## 2024-11-07 ENCOUNTER — TELEPHONE (OUTPATIENT)
Dept: FAMILY MEDICINE CLINIC | Facility: CLINIC | Age: 54
End: 2024-11-07

## 2024-11-07 ENCOUNTER — PATIENT MESSAGE (OUTPATIENT)
Dept: FAMILY MEDICINE CLINIC | Facility: CLINIC | Age: 54
End: 2024-11-07

## 2024-11-07 RX ORDER — ATORVASTATIN CALCIUM 80 MG/1
80 TABLET, FILM COATED ORAL DAILY
Qty: 90 TABLET | Refills: 0 | Status: SHIPPED | OUTPATIENT
Start: 2024-11-07 | End: 2024-11-07

## 2024-11-07 RX ORDER — ATORVASTATIN CALCIUM 80 MG/1
80 TABLET, FILM COATED ORAL DAILY
Qty: 90 TABLET | Refills: 0 | Status: SHIPPED | OUTPATIENT
Start: 2024-11-07

## 2024-11-07 NOTE — TELEPHONE ENCOUNTER
Refill request    Last office visit:6/26/24  Last labs:6/26/24  Last refills:8/7/24 and 8/9/24    Future office visit 12/9/24 physical.

## 2024-11-07 NOTE — TELEPHONE ENCOUNTER
Rx's Atorvastatin and Metformin  were sent to [facility]   Needs to be sent to [facility]

## 2024-11-18 ENCOUNTER — PATIENT MESSAGE (OUTPATIENT)
Dept: FAMILY MEDICINE CLINIC | Facility: CLINIC | Age: 54
End: 2024-11-18

## 2024-11-18 DIAGNOSIS — M25.519 SHOULDER PAIN, UNSPECIFIED CHRONICITY, UNSPECIFIED LATERALITY: Primary | ICD-10-CM

## 2024-11-22 ENCOUNTER — HOSPITAL ENCOUNTER (OUTPATIENT)
Age: 54
Discharge: HOME | End: 2024-11-22
Payer: COMMERCIAL

## 2024-11-22 PROCEDURE — 90471 IMMUNIZATION ADMIN: CPT | Performed by: EMERGENCY MEDICINE

## 2024-11-22 PROCEDURE — 90656 IIV3 VACC NO PRSV 0.5 ML IM: CPT | Performed by: EMERGENCY MEDICINE

## 2024-11-29 ENCOUNTER — TELEPHONE (OUTPATIENT)
Facility: CLINIC | Age: 54
End: 2024-11-29

## 2024-11-29 DIAGNOSIS — M25.512 ACUTE PAIN OF BOTH SHOULDERS: Primary | ICD-10-CM

## 2024-11-29 DIAGNOSIS — M25.511 ACUTE PAIN OF BOTH SHOULDERS: Primary | ICD-10-CM

## 2024-11-29 NOTE — TELEPHONE ENCOUNTER
Xray ordered per Ortho protocol, Xray scheduled.  [employer] message sent to patient to arrive 15 - 20 min early to complete imaging.

## 2024-12-02 ENCOUNTER — HOSPITAL ENCOUNTER (OUTPATIENT)
Dept: GENERAL RADIOLOGY | Age: 54
Discharge: HOME | End: 2024-12-02
Attending: FAMILY MEDICINE
Payer: COMMERCIAL

## 2024-12-02 ENCOUNTER — OFFICE VISIT (OUTPATIENT)
Facility: CLINIC | Age: 54
End: 2024-12-02
Payer: COMMERCIAL

## 2024-12-02 VITALS — HEIGHT: 69 IN | BODY MASS INDEX: 32.44 KG/M2 | WEIGHT: 219 LBS

## 2024-12-02 DIAGNOSIS — S46.112A STRAIN OF LONG HEAD OF BICEPS, LEFT, INITIAL ENCOUNTER: Primary | ICD-10-CM

## 2024-12-02 DIAGNOSIS — E11.9 TYPE 2 DIABETES MELLITUS WITHOUT COMPLICATION, WITH LONG-TERM CURRENT USE OF INSULIN (HCC): ICD-10-CM

## 2024-12-02 DIAGNOSIS — M25.511 ACUTE PAIN OF BOTH SHOULDERS: ICD-10-CM

## 2024-12-02 DIAGNOSIS — M25.512 ACUTE PAIN OF BOTH SHOULDERS: ICD-10-CM

## 2024-12-02 DIAGNOSIS — S46.111A STRAIN OF LONG HEAD OF BICEPS, RIGHT, INITIAL ENCOUNTER: ICD-10-CM

## 2024-12-02 DIAGNOSIS — M75.42 SUBACROMIAL IMPINGEMENT, LEFT: ICD-10-CM

## 2024-12-02 DIAGNOSIS — Z79.4 TYPE 2 DIABETES MELLITUS WITHOUT COMPLICATION, WITH LONG-TERM CURRENT USE OF INSULIN (HCC): ICD-10-CM

## 2024-12-02 PROCEDURE — 73030 X-RAY EXAM OF SHOULDER: CPT | Performed by: FAMILY MEDICINE

## 2024-12-02 PROCEDURE — 3008F BODY MASS INDEX DOCD: CPT | Performed by: FAMILY MEDICINE

## 2024-12-02 PROCEDURE — 99214 OFFICE O/P EST MOD 30 MIN: CPT | Performed by: FAMILY MEDICINE

## 2024-12-02 RX ORDER — MELOXICAM 15 MG/1
15 TABLET ORAL DAILY
Qty: 30 TABLET | Refills: 0 | Status: SHIPPED | OUTPATIENT
Start: 2024-12-02 | End: 2025-01-01

## 2024-12-02 RX ORDER — CYCLOBENZAPRINE HCL 10 MG
10 TABLET ORAL NIGHTLY
Qty: 20 TABLET | Refills: 0 | Status: SHIPPED | OUTPATIENT
Start: 2024-12-02 | End: 2024-12-22

## 2024-12-02 RX ORDER — INSULIN GLARGINE 100 [IU]/ML
12 INJECTION, SOLUTION SUBCUTANEOUS NIGHTLY
Qty: 15 ML | Refills: 0 | Status: SHIPPED | OUTPATIENT
Start: 2024-12-02

## 2024-12-02 NOTE — TELEPHONE ENCOUNTER
Last office visit 6/26/24  Last refilled on 8/23/24 for # 15ml with 0 refills  Future Appointments   Date Time Provider Department Center   12/2/2024  1:50 PM PF XR LL RM2 PF XRAY [place]   12/2/2024  2:05 PM PF XR LL RM2 PF XRAY [place]   12/2/2024  2:20 PM Destin Antonio DO [facility]THOP[facility] [address]   12/9/2024  2:40 PM Joslyn Castillo MD [facility] [place]        Thank you.

## 2024-12-02 NOTE — H&P
Sports Medicine Clinic Note    Subjective:    Chief Complaint: Bilateral shoulder pain, left more than right.    History: 54-year-old male presenting with bilateral shoulder pain, more pronounced on the left side, since playing golf much more frequently in October 2024. Symptoms include pain and weakness, particularly when reaching behind his back. The patient recalls a previous issue of shoulder impingement but feels this is a different problem. Pain is alleviated by foam rolling which he has been trying to do over the anterior shoulder at home. He denies current pain but reports nighttime discomfort and symptoms exacerbated by specific movements. No prior treatment with physical therapy, anti-inflammatory medications, or injections.    Objective:    Bilateral Shoulder Examination:    Inspection: No visible swelling, erythema, or deformity of either shoulder.  Palpation: Localized tenderness over the anterior shoulders bilaterally, more pronounced on the left.  Range of Motion: Full range of motion bilaterally with mild discomfort on internal rotation and overhead reaching on the left.  Neurovascular: Sensation intact to light touch in axillary, radial, ulnar, and median nerve distributions. Radial pulses palpable bilaterally.  Special Tests: Positive Speed's and Yergason's tests on the left, consistent with long head of biceps tendinopathy. Negative drop arm and lag signs bilaterally.    Diagnostic Tests:    Left Shoulder X-ray: Mild narrowing of the acromial-humeral distance (0.7 cm) with a slightly hooked acromion. Joint spaces are preserved with no fracture or dislocation.  Right Shoulder X-ray: Normal examination with no evidence of fracture, subluxation, or impingement.    Assessment:    Long head biceps tendinopathy, left shoulder.  Subacromial impingement syndrome also on differential, left shoulder.  Mild anterior shoulder pain, right shoulder, etiology uncertain but less clinically significant than the  left.    Plan:    Therapy: Initiate a home exercise program focusing on strengthening and flexibility for the shoulder girdle, specifically targeting rotator cuff and scapular stabilizers.  Medications: Prescribe NSAIDs for pain management and muscle relaxants to address nighttime discomfort.  Procedures: No injections performed today; deferred by the patient.  Activity Recommendations: Avoid overhead activities, heavy lifting, or movements that exacerbate symptoms. Gradual return to activities as tolerated.    Follow-Up: Tentatively scheduled in 4-6 weeks after completing the home exercise program. If symptoms persist, consider further management options such as corticosteroid injection or advanced imaging (MRI).      Destin Antonio DO, CAQSM   Primary Care Sports Medicine

## 2024-12-09 ENCOUNTER — OFFICE VISIT (OUTPATIENT)
Dept: FAMILY MEDICINE CLINIC | Facility: CLINIC | Age: 54
End: 2024-12-09
Payer: COMMERCIAL

## 2024-12-09 ENCOUNTER — PATIENT MESSAGE (OUTPATIENT)
Dept: FAMILY MEDICINE CLINIC | Facility: CLINIC | Age: 54
End: 2024-12-09

## 2024-12-09 VITALS
DIASTOLIC BLOOD PRESSURE: 82 MMHG | HEART RATE: 85 BPM | HEIGHT: 69 IN | OXYGEN SATURATION: 98 % | SYSTOLIC BLOOD PRESSURE: 134 MMHG | WEIGHT: 223.19 LBS | TEMPERATURE: 96 F | BODY MASS INDEX: 33.06 KG/M2 | RESPIRATION RATE: 16 BRPM

## 2024-12-09 DIAGNOSIS — Z00.00 ANNUAL PHYSICAL EXAM: Primary | ICD-10-CM

## 2024-12-09 DIAGNOSIS — N52.9 ERECTILE DYSFUNCTION, UNSPECIFIED ERECTILE DYSFUNCTION TYPE: ICD-10-CM

## 2024-12-09 DIAGNOSIS — E11.9 TYPE 2 DIABETES MELLITUS WITHOUT COMPLICATION, WITH LONG-TERM CURRENT USE OF INSULIN (HCC): ICD-10-CM

## 2024-12-09 DIAGNOSIS — Z00.00 GENERAL MEDICAL EXAM: ICD-10-CM

## 2024-12-09 DIAGNOSIS — Z12.11 SCREENING FOR COLON CANCER: ICD-10-CM

## 2024-12-09 DIAGNOSIS — E78.49 OTHER HYPERLIPIDEMIA: ICD-10-CM

## 2024-12-09 DIAGNOSIS — Z12.5 PROSTATE CANCER SCREENING: ICD-10-CM

## 2024-12-09 DIAGNOSIS — Z12.11 COLON CANCER SCREENING: Primary | ICD-10-CM

## 2024-12-09 DIAGNOSIS — I10 ESSENTIAL HYPERTENSION, BENIGN: ICD-10-CM

## 2024-12-09 DIAGNOSIS — Z79.4 TYPE 2 DIABETES MELLITUS WITHOUT COMPLICATION, WITH LONG-TERM CURRENT USE OF INSULIN (HCC): ICD-10-CM

## 2024-12-09 LAB
CREAT UR-SCNC: 95.7 MG/DL
MICROALBUMIN UR-MCNC: 0.7 MG/DL
MICROALBUMIN/CREAT 24H UR-RTO: 7.3 UG/MG (ref ?–30)

## 2024-12-09 PROCEDURE — 82570 ASSAY OF URINE CREATININE: CPT | Performed by: FAMILY MEDICINE

## 2024-12-09 PROCEDURE — 82043 UR ALBUMIN QUANTITATIVE: CPT | Performed by: FAMILY MEDICINE

## 2024-12-09 RX ORDER — TADALAFIL 20 MG/1
20 TABLET ORAL
Qty: 15 TABLET | Refills: 0 | Status: SHIPPED | OUTPATIENT
Start: 2024-12-09

## 2024-12-09 RX ORDER — LISINOPRIL AND HYDROCHLOROTHIAZIDE 20; 25 MG/1; MG/1
1 TABLET ORAL DAILY
Qty: 90 TABLET | Refills: 0 | OUTPATIENT
Start: 2024-12-09

## 2024-12-09 NOTE — TELEPHONE ENCOUNTER
Future Appointments   Date Time Provider Department Center   12/9/2024  2:40 PM Joslyn Castillo MD [facility] [place]   3/27/2025  7:00 AM Cody Nielson MD [facility] SUB GI

## 2024-12-09 NOTE — PROGRESS NOTES
Russ Padilla . is a 54 year old male who presents for a complete physical exam.   HPI:   No concerns today.  GI he has appt.  Exercise- minimal  Diet- healthy  Sleep is good he is snoring. No apnea.   DM eye exam [facility] eye care he got it done last month. He is on insulin he was diagnosed 10 yrs ago.   Discuss glp1 he want to hold it for now.   Hypertension on meds bp stable/  Hyperlipidemia on meds       Immunization History   Administered Date(s) Administered    Covid-19 Vaccine Moderna 100 mcg/0.5 ml 03/21/2021, 04/26/2021, 01/10/2022    FLULAVAL 6 months & older 0.5 ml Prefilled syringe (56260) 09/25/2017, 11/10/2018, 10/07/2019, 10/22/2020, 10/12/2021, 09/29/2022    FLUZONE 6 months and older PFS 0.5 ml (25284) 11/09/2023    Influenza Vaccine, trivalent (IIV3), PF 0.5mL (17038) 11/22/2024    Pneumococcal Conjugate PCV20 12/07/2023    TDAP 01/25/2008     Wt Readings from Last 6 Encounters:   12/09/24 223 lb 3.2 oz (101.2 kg)   12/02/24 219 lb (99.3 kg)   06/26/24 219 lb (99.3 kg)   04/24/24 223 lb (101.2 kg)   12/07/23 219 lb 12.8 oz (99.7 kg)   09/08/23 223 lb (101.2 kg)     Body mass index is 32.96 kg/m².     Lab Results   Component Value Date     (H) 06/26/2024     (H) 09/13/2023     (H) 06/08/2023     Lab Results   Component Value Date    CHOLEST 149 06/26/2024    CHOLEST 133 09/13/2023    CHOLEST 135 06/08/2023     Lab Results   Component Value Date    HDL 52 06/26/2024    HDL 44 09/13/2023    HDL 52 06/08/2023     Lab Results   Component Value Date    LDL 89 06/26/2024    LDL 79 09/13/2023    LDL 74 06/08/2023     Lab Results   Component Value Date    AST 25 06/26/2024    AST 15 09/13/2023    AST 19 06/08/2023     Lab Results   Component Value Date    ALT 31 06/26/2024    ALT 36 09/13/2023    ALT 35 06/08/2023     No results found for: \"PSA\"     Current Outpatient Medications   Medication Sig Dispense Refill    insulin glargine (LANTUS SOLOSTAR) 100 UNIT/ML Subcutaneous Solution  Pen-injector Inject 12 Units into the skin nightly. 15 mL 0    cyclobenzaprine 10 MG Oral Tab Take 1 tablet (10 mg total) by mouth nightly for 20 days. 20 tablet 0    atorvastatin 80 MG Oral Tab Take 1 tablet (80 mg total) by mouth daily. 90 tablet 0    metFORMIN HCl 1000 MG Oral Tab Take 1 tablet (1,000 mg total) by mouth 2 (two) times daily with meals. 180 tablet 0    fluticasone propionate 50 MCG/ACT Nasal Suspension 2 sprays by Nasal route daily. 16 g 0    lisinopril-hydroCHLOROthiazide 20-25 MG Oral Tab TAKE 1 TABLET DAILY 90 tablet 0    Fexofenadine HCl 30 MG/5ML Oral Suspension Take 5 mL (30 mg total) by mouth as needed.      Sildenafil Citrate 100 MG Oral Tab Take 1 tablet (100 mg total) by mouth daily as needed for Erectile Dysfunction. 18 tablet 0    Insulin Pen Needle (BD PEN NEEDLE SHORT U/F) 31G X 8 MM Does not apply Misc USE AND DISCARD 1 PEN      NEEDLE DAILY. 200 each 1    Glucose Blood (ONETOUCH ULTRA) In Vitro Strip 1 each by Other route in the morning and 1 each before bedtime. 300 strip 0    Glucose Blood (ONETOUCH ULTRA BLUE) In Vitro Strip TEST ONCE DAILY, AS DIRECTED 100 strip 1    aspirin 81 MG Oral Chew Tab Chew 1 tablet (81 mg total) by mouth.      Omega-3-acid Ethyl Esters 1 g Oral Cap Take 1 capsule (1 g total) by mouth daily.      Meloxicam 15 MG Oral Tab Take 1 tablet (15 mg total) by mouth daily. 30 tablet 0    Olopatadine HCl 0.7 % Ophthalmic Solution Apply 1 drop to eye daily. 2.5 mL 0    diclofenac 1 % External Gel Apply 2 g topically 4 (four) times daily. (Patient not taking: Reported on 12/9/2024) 50 g 0      Past Medical History:    Diabetes (HCC)    Essential hypertension      History reviewed. No pertinent surgical history.   Family History   Problem Relation Age of Onset    Diabetes Father     Heart Disorder Father       Social History:  Social History     Socioeconomic History    Marital status:    Tobacco Use    Smoking status: Former     Types: Cigars     Passive  exposure: Past    Smokeless tobacco: Never    Tobacco comments:     non-smoker   Vaping Use    Vaping status: Never Used   Substance and Sexual Activity    Alcohol use: Yes     Alcohol/week: 2.0 standard drinks of alcohol     Types: 2 Shots of liquor per week     Comment: OCC    Drug use: No           REVIEW OF SYSTEMS:   GENERAL: feels well   SKIN: denies any unusual skin lesions  EYES:denies blurred vision or double vision  HEENT: denies nasal congestion, sinus pain or ST  LUNGS: denies shortness of breath or cough with exertion  CARDIOVASCULAR: denies chest pain or palpitations  GI: denies abdominal pain. Denies heartburn. Has regular bowel movements. No blood in stool.  : denies nocturia or changes in stream. No hematuria.  MUSCULOSKELETAL: denies back pain  NEURO: denies headaches or dizziness  PSYCHE: denies depression or anxiety      EXAM:   /82 (BP Location: Right arm, Patient Position: Sitting, Cuff Size: adult)   Pulse 85   Temp (!) 96.3 °F (35.7 °C) (Temporal)   Resp 16   Ht 5' 9\" (1.753 m)   Wt 223 lb 3.2 oz (101.2 kg)   SpO2 98%   BMI 32.96 kg/m²   Body mass index is 32.96 kg/m².   GENERAL: well nourished,in no apparent distress  SKIN: no rashes  HEENT: ears and throat are clear  EYES:PERRLA, EOMI, conjunctiva are clear  NECK: supple,no adenopathy,no bruits. No thyromegaly  BREAST: no dominant or suspicious mass  LUNGS: clear to auscultation  CARDIO: RRR without murmur  GI: soft, good BS's,no masses, HSM or tenderness  MUSCULOSKELETAL: back is not tender  EXTREMITIES: no edema  NEURO: cranial nerves are intact,motor and sensory are grossly intact  Bilateral barefoot skin diabetic exam is normal, visualized feet and the appearance is normal.  Bilateral monofilament/sensation of both feet is normal.  Pulsation pedal pulse exam of both lower legs/feet is normal as well.     ASSESSMENT AND PLAN:   Russ Padilla JrChepe is a 54 year old male who presents for a complete physical exam. Pt's weight is  Body mass index is 32.96 kg/m²., recommended low fat/carb diet and aerobic exercise 30-45 minutes 5 times weekly.   Fasting BW ordered: Lipids, CMP, CBC, TSH, HbA1c and PSA.   Eye exam at [facility] we will get result.   The patient indicates understanding of these issues and agrees to the plan.  The patient is asked to return for CPX in 1 year.

## 2024-12-09 NOTE — TELEPHONE ENCOUNTER
Future Appointments   Date Time Provider Department Center   12/9/2024  2:40 PM Joslyn Castillo MD [facility] [place]   3/27/2025  7:00 AM Cody Nielson MD [facility] SUB GI

## 2024-12-12 RX ORDER — FLUTICASONE PROPIONATE 50 MCG
2 SPRAY, SUSPENSION (ML) NASAL DAILY
Qty: 16 G | Refills: 0 | Status: SHIPPED | OUTPATIENT
Start: 2024-12-12

## 2024-12-12 RX ORDER — LISINOPRIL AND HYDROCHLOROTHIAZIDE 20; 25 MG/1; MG/1
1 TABLET ORAL DAILY
Qty: 90 TABLET | Refills: 0 | Status: SHIPPED | OUTPATIENT
Start: 2024-12-12

## 2024-12-27 ENCOUNTER — LAB ENCOUNTER (OUTPATIENT)
Dept: LAB | Age: 54
End: 2024-12-27
Attending: NURSE PRACTITIONER
Payer: COMMERCIAL

## 2024-12-27 DIAGNOSIS — E78.49 OTHER HYPERLIPIDEMIA: ICD-10-CM

## 2024-12-27 DIAGNOSIS — Z79.4 TYPE 2 DIABETES MELLITUS WITHOUT COMPLICATION, WITH LONG-TERM CURRENT USE OF INSULIN (HCC): ICD-10-CM

## 2024-12-27 DIAGNOSIS — Z12.5 PROSTATE CANCER SCREENING: ICD-10-CM

## 2024-12-27 DIAGNOSIS — Z00.00 GENERAL MEDICAL EXAM: ICD-10-CM

## 2024-12-27 DIAGNOSIS — I10 ESSENTIAL HYPERTENSION, BENIGN: ICD-10-CM

## 2024-12-27 DIAGNOSIS — E11.9 TYPE 2 DIABETES MELLITUS WITHOUT COMPLICATION, WITH LONG-TERM CURRENT USE OF INSULIN (HCC): ICD-10-CM

## 2024-12-27 LAB
ALBUMIN SERPL-MCNC: 3.8 G/DL (ref 3.2–4.8)
ALBUMIN/GLOB SERPL: 1.1 {RATIO} (ref 1–2)
ALP LIVER SERPL-CCNC: 85 U/L
ALT SERPL-CCNC: 20 U/L
ANION GAP SERPL CALC-SCNC: 6 MMOL/L (ref 0–18)
AST SERPL-CCNC: 19 U/L (ref ?–34)
BASOPHILS # BLD AUTO: 0.05 X10(3) UL (ref 0–0.2)
BASOPHILS NFR BLD AUTO: 0.8 %
BILIRUB SERPL-MCNC: 0.7 MG/DL (ref 0.3–1.2)
BUN BLD-MCNC: 15 MG/DL (ref 9–23)
CALCIUM BLD-MCNC: 9.3 MG/DL (ref 8.7–10.4)
CHLORIDE SERPL-SCNC: 106 MMOL/L (ref 98–112)
CHOLEST SERPL-MCNC: 150 MG/DL (ref ?–200)
CO2 SERPL-SCNC: 29 MMOL/L (ref 21–32)
COMPLEXED PSA SERPL-MCNC: 0.35 NG/ML (ref ?–4)
CREAT BLD-MCNC: 1.04 MG/DL
EGFRCR SERPLBLD CKD-EPI 2021: 85 ML/MIN/1.73M2 (ref 60–?)
EOSINOPHIL # BLD AUTO: 0.26 X10(3) UL (ref 0–0.7)
EOSINOPHIL NFR BLD AUTO: 4.1 %
ERYTHROCYTE [DISTWIDTH] IN BLOOD BY AUTOMATED COUNT: 12.8 %
EST. AVERAGE GLUCOSE BLD GHB EST-MCNC: 134 MG/DL (ref 68–126)
FASTING PATIENT LIPID ANSWER: YES
FASTING STATUS PATIENT QL REPORTED: YES
GLOBULIN PLAS-MCNC: 3.5 G/DL (ref 2–3.5)
GLUCOSE BLD-MCNC: 122 MG/DL (ref 70–99)
HBA1C MFR BLD: 6.3 % (ref ?–5.7)
HCT VFR BLD AUTO: 45.8 %
HDLC SERPL-MCNC: 52 MG/DL (ref 40–59)
HGB BLD-MCNC: 15.5 G/DL
IMM GRANULOCYTES # BLD AUTO: 0.02 X10(3) UL (ref 0–1)
IMM GRANULOCYTES NFR BLD: 0.3 %
LDLC SERPL CALC-MCNC: 89 MG/DL (ref ?–100)
LYMPHOCYTES # BLD AUTO: 1.82 X10(3) UL (ref 1–4)
LYMPHOCYTES NFR BLD AUTO: 28.4 %
MCH RBC QN AUTO: 30.9 PG (ref 26–34)
MCHC RBC AUTO-ENTMCNC: 33.8 G/DL (ref 31–37)
MCV RBC AUTO: 91.4 FL
MONOCYTES # BLD AUTO: 0.58 X10(3) UL (ref 0.1–1)
MONOCYTES NFR BLD AUTO: 9 %
NEUTROPHILS # BLD AUTO: 3.68 X10 (3) UL (ref 1.5–7.7)
NEUTROPHILS # BLD AUTO: 3.68 X10(3) UL (ref 1.5–7.7)
NEUTROPHILS NFR BLD AUTO: 57.4 %
NONHDLC SERPL-MCNC: 98 MG/DL (ref ?–130)
OSMOLALITY SERPL CALC.SUM OF ELEC: 294 MOSM/KG (ref 275–295)
PLATELET # BLD AUTO: 200 10(3)UL (ref 150–450)
POTASSIUM SERPL-SCNC: 4 MMOL/L (ref 3.5–5.1)
PROT SERPL-MCNC: 7.3 G/DL (ref 5.7–8.2)
RBC # BLD AUTO: 5.01 X10(6)UL
SODIUM SERPL-SCNC: 141 MMOL/L (ref 136–145)
T4 FREE SERPL-MCNC: 1.2 NG/DL (ref 0.8–1.7)
TRIGL SERPL-MCNC: 36 MG/DL (ref 30–149)
TSI SER-ACNC: 0.96 UIU/ML (ref 0.55–4.78)
VLDLC SERPL CALC-MCNC: 6 MG/DL (ref 0–30)
WBC # BLD AUTO: 6.4 X10(3) UL (ref 4–11)

## 2024-12-27 PROCEDURE — 80050 GENERAL HEALTH PANEL: CPT | Performed by: NURSE PRACTITIONER

## 2024-12-27 PROCEDURE — 84153 ASSAY OF PSA TOTAL: CPT | Performed by: NURSE PRACTITIONER

## 2024-12-27 PROCEDURE — 83036 HEMOGLOBIN GLYCOSYLATED A1C: CPT | Performed by: FAMILY MEDICINE

## 2024-12-27 PROCEDURE — 84439 ASSAY OF FREE THYROXINE: CPT | Performed by: NURSE PRACTITIONER

## 2024-12-27 PROCEDURE — 80061 LIPID PANEL: CPT | Performed by: NURSE PRACTITIONER

## 2024-12-30 ENCOUNTER — TELEPHONE (OUTPATIENT)
Dept: FAMILY MEDICINE CLINIC | Facility: CLINIC | Age: 54
End: 2024-12-30

## 2024-12-31 ENCOUNTER — TELEPHONE (OUTPATIENT)
Dept: FAMILY MEDICINE CLINIC | Facility: CLINIC | Age: 54
End: 2024-12-31

## 2024-12-31 NOTE — TELEPHONE ENCOUNTER
Vivien with [facility] eye clinic called. Said she was returning a call regarding results

## 2025-02-06 DIAGNOSIS — N52.9 ERECTILE DYSFUNCTION, UNSPECIFIED ERECTILE DYSFUNCTION TYPE: ICD-10-CM

## 2025-02-06 DIAGNOSIS — Z79.4 TYPE 2 DIABETES MELLITUS WITHOUT COMPLICATION, WITH LONG-TERM CURRENT USE OF INSULIN (HCC): ICD-10-CM

## 2025-02-06 DIAGNOSIS — E11.9 TYPE 2 DIABETES MELLITUS WITHOUT COMPLICATION, WITH LONG-TERM CURRENT USE OF INSULIN (HCC): ICD-10-CM

## 2025-02-07 RX ORDER — TADALAFIL 20 MG/1
20 TABLET ORAL
Qty: 15 TABLET | Refills: 0 | Status: SHIPPED | OUTPATIENT
Start: 2025-02-07

## 2025-02-07 RX ORDER — FLUTICASONE PROPIONATE 50 MCG
2 SPRAY, SUSPENSION (ML) NASAL DAILY
Qty: 16 G | Refills: 0 | Status: SHIPPED | OUTPATIENT
Start: 2025-02-07

## 2025-02-07 RX ORDER — INSULIN GLARGINE 100 [IU]/ML
12 INJECTION, SOLUTION SUBCUTANEOUS NIGHTLY
Qty: 15 ML | Refills: 0 | Status: SHIPPED | OUTPATIENT
Start: 2025-02-07

## 2025-02-07 NOTE — TELEPHONE ENCOUNTER
Last office visit 12/9/24  Last refilled on 12/9/24 for # 15 with 0 refills  Future Appointments   Date Time Provider Department Center   4/17/2025  8:15 AM Cody Nielson MD [facility]        Thank you.

## 2025-02-15 RX ORDER — ATORVASTATIN CALCIUM 80 MG/1
80 TABLET, FILM COATED ORAL DAILY
Qty: 90 TABLET | Refills: 0 | Status: SHIPPED | OUTPATIENT
Start: 2025-02-15

## 2025-02-15 NOTE — TELEPHONE ENCOUNTER
Diabetes Medication Protocol Passed          Cholesterol Medication Protocol Passed      Last refill 11/7/24 90 days 0 refill

## 2025-03-10 RX ORDER — LISINOPRIL AND HYDROCHLOROTHIAZIDE 20; 25 MG/1; MG/1
1 TABLET ORAL DAILY
Qty: 90 TABLET | Refills: 0 | Status: SHIPPED | OUTPATIENT
Start: 2025-03-10

## 2025-03-10 NOTE — TELEPHONE ENCOUNTER
Hypertension Medications Protocol Lwfgsn1303/09/2025 07:07 AM   Protocol Details CMP or BMP in past 12 months    Last BP reading less than 140/90    In person appointment or virtual visit in the past 12 mos or appointment in next 3 mos    EGFRCR or GFRAA > 50    Medication is active on med list     Request for LISINOPRIL-HYDROCHLOROTHIAZIDE 20-25 MG Oral Tab     LOV 12/9/24 with    Last refill 12/12/24 - 90 tablets 0 refill    Future Appointments   Date Time Provider Department Center   4/17/2025  8:15 AM Cody Nielson MD [facility]   Labs 12/27/24

## 2025-04-17 PROBLEM — Z86.0101 PERSONAL HISTORY OF ADENOMATOUS AND SERRATED COLON POLYPS: Status: ACTIVE | Noted: 2025-04-17

## 2025-05-09 RX ORDER — ATORVASTATIN CALCIUM 80 MG/1
80 TABLET, FILM COATED ORAL DAILY
Qty: 90 TABLET | Refills: 0 | Status: SHIPPED | OUTPATIENT
Start: 2025-05-09

## 2025-05-09 NOTE — TELEPHONE ENCOUNTER
LOV: 12/9/224 for physical      ATORVASTATIN 80 MG Oral Tab  TAKE 1 TABLET DAILY Dispense: 90 tablet, Refills: 0 ordered        02/15/2025       Cholesterol Medication Protocol Cengrs9105/09/2025 01:11 AM   Protocol Details ALT < 80    ALT resulted within past year    Lipid panel within past 12 months    In person appointment or virtual visit in the past 12 mos or appointment in next 3 mos    Medication is active on med list     No future appointments.

## 2025-05-20 RX ORDER — FLUTICASONE PROPIONATE 50 MCG
2 SPRAY, SUSPENSION (ML) NASAL DAILY
Qty: 16 G | Refills: 0 | OUTPATIENT
Start: 2025-05-20

## 2025-05-20 RX ORDER — FLUTICASONE PROPIONATE 50 MCG
2 SPRAY, SUSPENSION (ML) NASAL DAILY
Qty: 16 G | Refills: 0 | Status: SHIPPED | OUTPATIENT
Start: 2025-05-20

## 2025-05-20 NOTE — TELEPHONE ENCOUNTER
Fluticasone nasal spray last refilled 2/7/25  No future appointment I office  LOV with  12/9/24    Allergy Medication Protocol [id number]/20/2025 09:01 AM   Protocol Details In person appointment or virtual visit in the past 12 mos or appointment in next 3 mos    Medication is active on med list

## 2025-05-28 DIAGNOSIS — Z79.4 TYPE 2 DIABETES MELLITUS WITHOUT COMPLICATION, WITH LONG-TERM CURRENT USE OF INSULIN (HCC): ICD-10-CM

## 2025-05-28 DIAGNOSIS — E11.9 TYPE 2 DIABETES MELLITUS WITHOUT COMPLICATION, WITH LONG-TERM CURRENT USE OF INSULIN (HCC): ICD-10-CM

## 2025-05-28 RX ORDER — PEN NEEDLE, DIABETIC 31 GX5/16"
1 NEEDLE, DISPOSABLE MISCELLANEOUS DAILY
Qty: 300 EACH | Refills: 3 | Status: SHIPPED | OUTPATIENT
Start: 2025-05-28

## 2025-05-28 RX ORDER — LISINOPRIL AND HYDROCHLOROTHIAZIDE 20; 25 MG/1; MG/1
1 TABLET ORAL DAILY
Qty: 90 TABLET | Refills: 0 | Status: SHIPPED | OUTPATIENT
Start: 2025-05-28

## 2025-05-28 NOTE — TELEPHONE ENCOUNTER
Hypertension Medications Protocol Passed      Diabetic Supplies Protocol Passe     Diabetes Medication Protocol Passed

## 2025-06-05 DIAGNOSIS — N52.9 ERECTILE DYSFUNCTION, UNSPECIFIED ERECTILE DYSFUNCTION TYPE: ICD-10-CM

## 2025-06-05 DIAGNOSIS — Z79.4 TYPE 2 DIABETES MELLITUS WITHOUT COMPLICATION, WITH LONG-TERM CURRENT USE OF INSULIN (HCC): Primary | ICD-10-CM

## 2025-06-05 DIAGNOSIS — I10 ESSENTIAL HYPERTENSION, BENIGN: ICD-10-CM

## 2025-06-05 DIAGNOSIS — E78.49 OTHER HYPERLIPIDEMIA: ICD-10-CM

## 2025-06-05 DIAGNOSIS — E11.9 TYPE 2 DIABETES MELLITUS WITHOUT COMPLICATION, WITH LONG-TERM CURRENT USE OF INSULIN (HCC): Primary | ICD-10-CM

## 2025-06-05 RX ORDER — TADALAFIL 20 MG/1
TABLET ORAL
Qty: 15 TABLET | Refills: 0 | Status: SHIPPED | OUTPATIENT
Start: 2025-06-05

## 2025-06-05 NOTE — TELEPHONE ENCOUNTER
Last office visit 12/9/24  Last refilled on 2/7/25 for # 15 with 0 refills  No future appointments.     Thank you.

## 2025-06-06 NOTE — TELEPHONE ENCOUNTER
Scheduled.  Future Appointments   Date Time Provider Department Center   6/16/2025  8:00 AM Talya Snyder APRN [facility]

## 2025-06-16 ENCOUNTER — OFFICE VISIT (OUTPATIENT)
Dept: FAMILY MEDICINE CLINIC | Facility: CLINIC | Age: 55
End: 2025-06-16
Payer: COMMERCIAL

## 2025-06-16 ENCOUNTER — PATIENT MESSAGE (OUTPATIENT)
Dept: FAMILY MEDICINE CLINIC | Facility: CLINIC | Age: 55
End: 2025-06-16

## 2025-06-16 ENCOUNTER — LAB ENCOUNTER (OUTPATIENT)
Dept: LAB | Age: 55
End: 2025-06-16
Attending: NURSE PRACTITIONER
Payer: COMMERCIAL

## 2025-06-16 ENCOUNTER — TELEPHONE (OUTPATIENT)
Dept: FAMILY MEDICINE CLINIC | Facility: CLINIC | Age: 55
End: 2025-06-16

## 2025-06-16 VITALS
HEART RATE: 71 BPM | TEMPERATURE: 97 F | HEIGHT: 69 IN | SYSTOLIC BLOOD PRESSURE: 116 MMHG | OXYGEN SATURATION: 98 % | DIASTOLIC BLOOD PRESSURE: 80 MMHG | BODY MASS INDEX: 32.14 KG/M2 | WEIGHT: 217 LBS

## 2025-06-16 DIAGNOSIS — Z79.4 TYPE 2 DIABETES MELLITUS WITHOUT COMPLICATION, WITH LONG-TERM CURRENT USE OF INSULIN (HCC): ICD-10-CM

## 2025-06-16 DIAGNOSIS — E11.9 TYPE 2 DIABETES MELLITUS WITHOUT COMPLICATION, WITH LONG-TERM CURRENT USE OF INSULIN (HCC): Primary | ICD-10-CM

## 2025-06-16 DIAGNOSIS — E78.49 OTHER HYPERLIPIDEMIA: ICD-10-CM

## 2025-06-16 DIAGNOSIS — Z79.4 TYPE 2 DIABETES MELLITUS WITHOUT COMPLICATION, WITH LONG-TERM CURRENT USE OF INSULIN (HCC): Primary | ICD-10-CM

## 2025-06-16 DIAGNOSIS — E11.9 TYPE 2 DIABETES MELLITUS WITHOUT COMPLICATION, WITH LONG-TERM CURRENT USE OF INSULIN (HCC): ICD-10-CM

## 2025-06-16 DIAGNOSIS — Z23 NEED FOR VACCINATION: ICD-10-CM

## 2025-06-16 DIAGNOSIS — I10 ESSENTIAL HYPERTENSION, BENIGN: ICD-10-CM

## 2025-06-16 LAB
ALBUMIN SERPL-MCNC: 4.4 G/DL (ref 3.2–4.8)
ALBUMIN/GLOB SERPL: 1.4 {RATIO} (ref 1–2)
ALP LIVER SERPL-CCNC: 78 U/L (ref 45–117)
ALT SERPL-CCNC: 22 U/L (ref 10–49)
ANION GAP SERPL CALC-SCNC: 8 MMOL/L (ref 0–18)
AST SERPL-CCNC: 24 U/L (ref ?–34)
BILIRUB SERPL-MCNC: 0.9 MG/DL (ref 0.3–1.2)
BUN BLD-MCNC: 19 MG/DL (ref 9–23)
CALCIUM BLD-MCNC: 9.4 MG/DL (ref 8.7–10.6)
CHLORIDE SERPL-SCNC: 107 MMOL/L (ref 98–112)
CHOLEST SERPL-MCNC: 151 MG/DL (ref ?–200)
CO2 SERPL-SCNC: 30 MMOL/L (ref 21–32)
CREAT BLD-MCNC: 1.06 MG/DL (ref 0.7–1.3)
EGFRCR SERPLBLD CKD-EPI 2021: 83 ML/MIN/1.73M2 (ref 60–?)
EST. AVERAGE GLUCOSE BLD GHB EST-MCNC: 134 MG/DL (ref 68–126)
FASTING PATIENT LIPID ANSWER: YES
FASTING STATUS PATIENT QL REPORTED: YES
GLOBULIN PLAS-MCNC: 3.2 G/DL (ref 2–3.5)
GLUCOSE BLD-MCNC: 123 MG/DL (ref 70–99)
HBA1C MFR BLD: 6.3 % (ref ?–5.7)
HDLC SERPL-MCNC: 48 MG/DL (ref 40–59)
LDLC SERPL CALC-MCNC: 95 MG/DL (ref ?–100)
NONHDLC SERPL-MCNC: 103 MG/DL (ref ?–130)
OSMOLALITY SERPL CALC.SUM OF ELEC: 304 MOSM/KG (ref 275–295)
POTASSIUM SERPL-SCNC: 3.7 MMOL/L (ref 3.5–5.1)
PROT SERPL-MCNC: 7.6 G/DL (ref 5.7–8.2)
SODIUM SERPL-SCNC: 145 MMOL/L (ref 136–145)
TRIGL SERPL-MCNC: 34 MG/DL (ref 30–149)
VLDLC SERPL CALC-MCNC: 6 MG/DL (ref 0–30)

## 2025-06-16 PROCEDURE — 3008F BODY MASS INDEX DOCD: CPT | Performed by: NURSE PRACTITIONER

## 2025-06-16 PROCEDURE — 3074F SYST BP LT 130 MM HG: CPT | Performed by: NURSE PRACTITIONER

## 2025-06-16 PROCEDURE — 99213 OFFICE O/P EST LOW 20 MIN: CPT | Performed by: NURSE PRACTITIONER

## 2025-06-16 PROCEDURE — 90715 TDAP VACCINE 7 YRS/> IM: CPT | Performed by: NURSE PRACTITIONER

## 2025-06-16 PROCEDURE — 3079F DIAST BP 80-89 MM HG: CPT | Performed by: NURSE PRACTITIONER

## 2025-06-16 PROCEDURE — G2211 COMPLEX E/M VISIT ADD ON: HCPCS | Performed by: NURSE PRACTITIONER

## 2025-06-16 PROCEDURE — 80053 COMPREHEN METABOLIC PANEL: CPT | Performed by: NURSE PRACTITIONER

## 2025-06-16 PROCEDURE — 83036 HEMOGLOBIN GLYCOSYLATED A1C: CPT | Performed by: NURSE PRACTITIONER

## 2025-06-16 PROCEDURE — 90471 IMMUNIZATION ADMIN: CPT | Performed by: NURSE PRACTITIONER

## 2025-06-16 PROCEDURE — 80061 LIPID PANEL: CPT | Performed by: NURSE PRACTITIONER

## 2025-06-16 NOTE — PROGRESS NOTES
HPI:     Patient presents today for diabetes follow up.    Current Weight: 217 lb   Body mass index is 32.05 kg/m².  Current DM Medications: Lantus 12 units nightly, Metformin   Medication Side Effects: possibly, some GI upset if he eats an omelet   Previously Tried Medications/Methods: none   ACE/ARB: Lisinopril   Statin: Atorvastatin   Diabetic Eye Exam: 11/2024  Last Diabetic Foot Exam: 12/2024  Urine Micro: 12/2024  Other new issues or concerns today: none     Lab Results   Component Value Date    A1C 6.3 (H) 12/27/2024    A1C 6.6 (H) 06/26/2024    A1C 6.6 (H) 09/13/2023    A1C 6.8 (H) 06/08/2023    A1C 6.2 (H) 09/29/2022       Recent Results (from the past 4380 hours)   Comp Metabolic Panel (14) [E]    Collection Time: 12/27/24 10:07 AM   Result Value Ref Range    Glucose 122 (H) 70 - 99 mg/dL    Sodium 141 136 - 145 mmol/L    Potassium 4.0 3.5 - 5.1 mmol/L    Chloride 106 98 - 112 mmol/L    CO2 29.0 21.0 - 32.0 mmol/L    Anion Gap 6 0 - 18 mmol/L    BUN 15 9 - 23 mg/dL    Creatinine 1.04 0.70 - 1.30 mg/dL    Calcium, Total 9.3 8.7 - 10.4 mg/dL    Calculated Osmolality 294 275 - 295 mOsm/kg    eGFR-Cr 85 >=60 mL/min/1.73m2    AST 19 <34 U/L    ALT 20 10 - 49 U/L    Alkaline Phosphatase 85 45 - 117 U/L    Bilirubin, Total 0.7 0.3 - 1.2 mg/dL    Total Protein 7.3 5.7 - 8.2 g/dL    Albumin 3.8 3.2 - 4.8 g/dL    Globulin  3.5 2.0 - 3.5 g/dL    A/G Ratio 1.1 1.0 - 2.0    Patient Fasting for CMP? Yes       Cholesterol: 150, done on 12/27/2024.  HDL Cholesterol: 52, done on 12/27/2024.  LDL Cholesterol: 89, done on 12/27/2024.  TriGlycerides 36, done on 12/27/2024.       Current Medications[1]   Past Medical History[2]   Past Surgical History[3]   Family History[4]   Short Social Hx on File[5]      REVIEW OF SYSTEMS:   Review of Systems   Constitutional:  Negative for chills, fatigue and fever.   Respiratory:  Negative for cough and shortness of breath.    Cardiovascular:  Negative for chest pain and palpitations.    Endocrine: Negative for polydipsia, polyphagia and polyuria.       EXAM:   /80   Pulse 71   Temp 97.2 °F (36.2 °C)   Ht 5' 9\" (1.753 m)   Wt 217 lb (98.4 kg)   SpO2 98%   BMI 32.05 kg/m²   Physical Exam  Constitutional:       General: He is not in acute distress.     Appearance: He is obese.   Cardiovascular:      Rate and Rhythm: Normal rate and regular rhythm.      Heart sounds: Normal heart sounds.   Pulmonary:      Effort: Pulmonary effort is normal.      Breath sounds: Normal breath sounds.   Neurological:      Mental Status: He is alert.   Psychiatric:         Mood and Affect: Mood normal.         ASSESSMENT AND PLAN:   Diagnoses and all orders for this visit:    Type 2 diabetes mellitus without complication, with long-term current use of insulin (HCC)  Comments:  doing well with current medications, check lab work. Eye exam due in Nov.    Other hyperlipidemia  Comments:  tolerating high dose Atorvastatin, check lipids today    Essential hypertension, benign  Comments:  BP at goal, CPM, check lab work    Need for vaccination  Comments:  will get Shingrix in future  Orders:  -     TdaP (Adacel, Boostrix) [50046]      Note to patient: The 21st Century Cures Act makes medical notes like these available to patients in the interest of transparency. However, be advised this is a medical document. It is intended as peer to peer communication. It is written in medical language and may contain abbreviations or verbiage that are unfamiliar. It may appear blunt or direct. Medical documents are intended to carry relevant information, facts as evident, and the clinical opinion of the practitioner.           [1]   Current Outpatient Medications   Medication Sig Dispense Refill    TADALAFIL 20 MG Oral Tab TAKE 1 TABLET DAILY AS     NEEDED FOR ERECTILE        DYSFUNCTION, TAKE IF 1 HOURBEFORE SEXUAL ACTIVITY 15 tablet 0    LISINOPRIL-HYDROCHLOROTHIAZIDE 20-25 MG Oral Tab TAKE 1 TABLET DAILY 90 tablet 0     Insulin Pen Needle (BD PEN NEEDLE SHORT ULTRAFINE) 31G X 8 MM Does not apply Misc USE AND DISCARD 1 PEN      NEEDLE DAILY. 300 each 3    METFORMIN HCL 1000 MG Oral Tab TAKE 1 TABLET TWICE DAILY  WITH MEALS 180 tablet 0    fluticasone propionate 50 MCG/ACT Nasal Suspension 2 sprays by Nasal route daily. 16 g 0    ATORVASTATIN 80 MG Oral Tab TAKE 1 TABLET DAILY 90 tablet 0    insulin glargine (LANTUS SOLOSTAR) 100 UNIT/ML Subcutaneous Solution Pen-injector Inject 12 Units into the skin nightly. 15 mL 0    Fexofenadine HCl 30 MG/5ML Oral Suspension Take 5 mL (30 mg total) by mouth as needed.      Olopatadine HCl 0.7 % Ophthalmic Solution Apply 1 drop to eye daily. 2.5 mL 0    diclofenac 1 % External Gel Apply 2 g topically 4 (four) times daily. 50 g 0    Glucose Blood (ONETOUCH ULTRA) In Vitro Strip 1 each by Other route in the morning and 1 each before bedtime. 300 strip 0    Glucose Blood (ONETOUCH ULTRA BLUE) In Vitro Strip TEST ONCE DAILY, AS DIRECTED 100 strip 1    aspirin 81 MG Oral Chew Tab Chew 1 tablet (81 mg total) by mouth.      Omega-3-acid Ethyl Esters 1 g Oral Cap Take 1 capsule (1 g total) by mouth daily.     [2]   Past Medical History:   Blood in the stool    Diabetes (HCC)    Diabetes mellitus (HCC)    Essential hypertension   [3] History reviewed. No pertinent surgical history.  [4]   Family History  Problem Relation Age of Onset    Heart Attack Father     Diabetes Father     Heart Disorder Father    [5]   Social History  Socioeconomic History    Marital status:    Tobacco Use    Smoking status: Former     Types: Cigars     Passive exposure: Past    Smokeless tobacco: Never    Tobacco comments:     non-smoker   Vaping Use    Vaping status: Never Used   Substance and Sexual Activity    Alcohol use: Yes     Alcohol/week: 2.0 standard drinks of alcohol     Types: 2 Cans of beer per week     Comment: OCC    Drug use: No

## 2025-06-16 NOTE — TELEPHONE ENCOUNTER
----- Message from Talya Snyder sent at 6/16/2025  2:30 PM CDT -----  Results reviewed.   A1C stable  Chemistries stable, normal kidney and liver function  Cholesterol stable    Continue same medications & repeat labs in 6 months    ----- Message -----  From: Lab, Background User  Sent: 6/16/2025   1:01 PM CDT  To: ANSHU Liu

## 2025-07-02 RX ORDER — FLUTICASONE PROPIONATE 50 MCG
2 SPRAY, SUSPENSION (ML) NASAL DAILY
Qty: 16 G | Refills: 0 | Status: SHIPPED | OUTPATIENT
Start: 2025-07-02

## 2025-07-02 RX ORDER — FLUTICASONE PROPIONATE 50 MCG
2 SPRAY, SUSPENSION (ML) NASAL DAILY
Qty: 16 G | Refills: 0 | OUTPATIENT
Start: 2025-07-02

## 2025-07-02 NOTE — TELEPHONE ENCOUNTER
Last refill: 25  Qty 16 g  W/ 0 refills  Last ov: 25    Requested Prescriptions     Pending Prescriptions Disp Refills    fluticasone propionate 50 MCG/ACT Nasal Suspension 16 g 0     Si sprays by Nasal route daily.     No future appointments.    Allergy Medication Protocol [id number] 09:27 AM   Protocol Details In person appointment or virtual visit in the past 12 mos or appointment in next 3 mos    Medication is active on med list

## 2025-07-02 NOTE — TELEPHONE ENCOUNTER
Last refill: 07/02/25  Qty 16 g  W/ 0 refills  Last ov: 06/16/25    Requested Prescriptions     Pending Prescriptions Disp Refills    FLUTICASONE PROPIONATE 50 MCG/ACT Nasal Suspension [Pharmacy Med Name: FLUTICASONE  SPR 50MCG RX] 16 g 0     Sig: USE 2 SPRAYS NASALLY DAILY     No future appointments.

## 2025-08-05 RX ORDER — FLUTICASONE PROPIONATE 50 MCG
2 SPRAY, SUSPENSION (ML) NASAL DAILY
Qty: 16 G | Refills: 3 | Status: SHIPPED | OUTPATIENT
Start: 2025-08-05

## 2025-08-06 RX ORDER — ATORVASTATIN CALCIUM 80 MG/1
80 TABLET, FILM COATED ORAL DAILY
Qty: 90 TABLET | Refills: 1 | Status: SHIPPED | OUTPATIENT
Start: 2025-08-06

## 2025-08-25 DIAGNOSIS — Z79.4 TYPE 2 DIABETES MELLITUS WITHOUT COMPLICATION, WITH LONG-TERM CURRENT USE OF INSULIN (HCC): ICD-10-CM

## 2025-08-25 DIAGNOSIS — E11.9 TYPE 2 DIABETES MELLITUS WITHOUT COMPLICATION, WITH LONG-TERM CURRENT USE OF INSULIN (HCC): ICD-10-CM

## 2025-08-25 RX ORDER — INSULIN GLARGINE 100 [IU]/ML
12 INJECTION, SOLUTION SUBCUTANEOUS NIGHTLY
Qty: 15 ML | Refills: 1 | Status: SHIPPED | OUTPATIENT
Start: 2025-08-25

## 2025-08-25 RX ORDER — LISINOPRIL AND HYDROCHLOROTHIAZIDE 20; 25 MG/1; MG/1
1 TABLET ORAL DAILY
Qty: 90 TABLET | Refills: 0 | Status: SHIPPED | OUTPATIENT
Start: 2025-08-25

## 2025-08-25 RX ORDER — INSULIN GLARGINE 100 [IU]/ML
12 INJECTION, SOLUTION SUBCUTANEOUS NIGHTLY
Qty: 15 ML | Refills: 1 | OUTPATIENT
Start: 2025-08-25

## (undated) DIAGNOSIS — Z20.822 ENCOUNTER FOR LABORATORY TESTING FOR COVID-19 VIRUS: Primary | ICD-10-CM

## (undated) NOTE — LETTER
11/15/2021      Manuel Drew. [id number]       Dear Patient,    IF YOU ARE 48YEARS OF AGE OR OLDER, COLORECTAL CANCER SCREENING IS RECOMMENDED AND COULD SAVE YOUR LIFE.      As your primary care doctor, I want to share with you t

## (undated) NOTE — LETTER
Diabetes Retinal Eye Examination Report    Patient Name: Pamela Ibarra. : 10/9/1970    Referring Physician: Marco A Srivastava MD  _____________________________________________________________________________  Patient - Please bring this form to your next eye examination appointment. Give it to your eye care professional to fill out and return via fax to your primary care provider. Eye Care Professional - Please fill out this form and fax it back to the referring  primary care physician.   _____________________________________________________________________________     Date of Exam: ___/___/___    The patient received a dilated fundus examination with the following results:    ___ No diabetic retinopathy detected  ___ Background retinopathy was detected, but only requires monitoring  ___ Retinopathy requiring further testing and/or treatment was detected. See notes below. Notes / Commentary / Recommendations:  _________________________________________________________________________________________________________________________________________________________________________________________________________________________________    The patient is to return for follow-up / evaluation in ____ months.     Eye Care Provider (Print): _______________________Signature___________________ Date ___ / ___/___    Clinic/Office name: _______________________Ph:_____________Fax:_____________

## (undated) NOTE — LETTER
Date: 3/18/2021    Patient Name: Ely Kumar. To Whom it may concern: This letter has been written at the patient's request. The above patient has a health condition that qualify him to receive the Covid-19 vaccine.          Sincerely,    Sh